# Patient Record
Sex: FEMALE | Race: WHITE | NOT HISPANIC OR LATINO | Employment: UNEMPLOYED | URBAN - METROPOLITAN AREA
[De-identification: names, ages, dates, MRNs, and addresses within clinical notes are randomized per-mention and may not be internally consistent; named-entity substitution may affect disease eponyms.]

---

## 2017-12-28 PROBLEM — E66.9 OBESITY (BMI 30-39.9): Status: ACTIVE | Noted: 2017-12-28

## 2018-04-12 ENCOUNTER — HOSPITAL ENCOUNTER (OUTPATIENT)
Dept: RADIOLOGY | Facility: MEDICAL CENTER | Age: 38
End: 2018-04-12

## 2018-04-23 ENCOUNTER — OFFICE VISIT (OUTPATIENT)
Dept: PULMONOLOGY | Facility: HOSPICE | Age: 38
End: 2018-04-23
Payer: OTHER GOVERNMENT

## 2018-04-23 VITALS
HEART RATE: 86 BPM | DIASTOLIC BLOOD PRESSURE: 78 MMHG | SYSTOLIC BLOOD PRESSURE: 118 MMHG | BODY MASS INDEX: 38.98 KG/M2 | OXYGEN SATURATION: 96 % | HEIGHT: 63 IN | RESPIRATION RATE: 16 BRPM | WEIGHT: 220 LBS | TEMPERATURE: 97.7 F

## 2018-04-23 DIAGNOSIS — G47.33 OBSTRUCTIVE SLEEP APNEA: ICD-10-CM

## 2018-04-23 DIAGNOSIS — E66.9 OBESITY (BMI 30-39.9): ICD-10-CM

## 2018-04-23 PROCEDURE — 99204 OFFICE O/P NEW MOD 45 MIN: CPT | Performed by: INTERNAL MEDICINE

## 2018-04-23 NOTE — PATIENT INSTRUCTIONS
This lady comes in for evaluation of probable sleep apnea with nighttime palpitations, extensive workup recently by cardiology identified no heart disease, normal echo and normal stress test, but palpitations occur at night and awaken her from sleep. She sometimes awakens dizzy. I saw suspect she has significant sleep-disordered breathing with medically significant excessive weight, awakening at night with snoring and resuscitative snorts, daytime sleepiness, and we will proceed as quickly as possible toward a sleep study.    She does have neurology evaluation pending to make certain there is not a central nervous system contribution to her cardiac events.    I strongly suspect that sleep disordered breathing is a major contributor, I showed her examples of cardiac irritability related to hypoxemia, and then the benefit of CPAP and reviewed expectations.    We will arrange for a sleep study, hopefully split-night, weight loss is advised with portion control and gentle exercise given the body mass index of 39, but she has knee problems and has been unable to exercise recently. We will see her back in the sleep center once we have results of her sleep study.

## 2018-04-23 NOTE — PROGRESS NOTES
Karol Rhodes is a 38 y.o. female here for palpitations with possible sleep-disordered breathing. Patient was referred by her primary care doctor.    History of Present Illness:    This lady comes in for evaluation of probable sleep apnea with nighttime palpitations, extensive workup recently by cardiology identified no heart disease, normal echo and normal stress test, but palpitations occur at night and awaken her from sleep. She sometimes awakens dizzy. I saw suspect she has significant sleep-disordered breathing with medically significant excessive weight, awakening at night with snoring and resuscitative snorts, daytime sleepiness, and we will proceed as quickly as possible toward a sleep study.    She does have neurology evaluation pending to make certain there is not a central nervous system contribution to her cardiac events.    I strongly suspect that sleep disordered breathing is a major contributor, I showed her examples of cardiac irritability related to hypoxemia, and then the benefit of CPAP and reviewed expectations.    We will arrange for a sleep study, hopefully split-night, weight loss is advised with portion control and gentle exercise given the body mass index of 39, but she has knee problems and has been unable to exercise recently. We will see her back in the sleep center once we have results of her sleep study.    Of note her pulmonary history includes evaluation for prior shortness of breath, CT scan showed no pulmonary embolus, she does not have cough hemoptysis and no current active symptoms. Deconditioning with excessive weight and knee pain are all noted. She does have 5 children.      Constitutional ROS: No unexpected change in weight, No unexplained fevers  Eyes: No change in vision or blurring or double vision  Mouth/Throat ROS: No sore throat, No recent change in voice or hoarseness  Pulmonary ROS: See present history for pertinent positives  Cardiovascular ROS: No chest pain to  "suggest acute coronary syndrome  Gastrointestinal ROS: No abdominal pain to suggest peptic disease  Musculoskeletal/Extremities ROS: no acute artritis or unusual swelling  Hematologic/Lymphatic ROS: No easy bleeding or unusual lymph node swelling  Neurologic ROS: No new or unusual weakness  Psychiatric ROS: No hallucinations  Allergic/Immunologic: No  urticaria or allergic rash      Current Outpatient Prescriptions   Medication Sig Dispense Refill   • azithromycin (ZITHROMAX) 250 MG Tab Use Z-pack as directed 6 Tab 0     No current facility-administered medications for this visit.        Social History   Substance Use Topics   • Smoking status: Former Smoker     Packs/day: 0.25     Years: 5.00     Types: Cigarettes   • Smokeless tobacco: Never Used      Comment: smoked only socially, off and on over 5yrs; quit quite a few yrs ago   • Alcohol use Yes      Comment: beer and liquor rare use 1-2        Past Medical History:   Diagnosis Date   • Arthritis    • Fatigue    • History of chickenpox    • Menarche     age 9   • Skipped heart beats    • Sleep difficulties     increased daytime sleepiness   • SOB (shortness of breath)    • Weight gain        Past Surgical History:   Procedure Laterality Date   • GYN SURGERY      vaginal cyst removal   • OTHER ORTHOPEDIC SURGERY      left knee x 5, right knee       Allergies: Patient has no known allergies.    Family History   Problem Relation Age of Onset   • Arthritis Mother    • Cancer Other      cancer grandparents   • Other Other      bleeding tendency grandmother; auth with convulsions   • Diabetes Other      nephew and neice       Physical Examination    Vitals:    04/23/18 1345   Height: 1.6 m (5' 3\")   Weight: 99.8 kg (220 lb)   Weight % change since last entry.: 0 %   BP: 118/78   Pulse: 86   BMI (Calculated): 38.97   Resp: 16   Temp: 36.5 °C (97.7 °F)   O2 sat % room air: 96 %       General Appearance: alert, no distress  Skin: Skin color, texture, turgor normal. No " rashes or lesions.  Eyes: negative  Oropharynx: Lips, mucosa, and tongue normal. Teeth and gums normal. Oropharynx moist and without lesion  Lungs: positive findings: Remarkably clear  Heart: negative. RRR without murmur, gallop, or rubs.  No ectopy.  Abdomen: Abdomen soft, non-tender. . No masses,  No organomegaly  Extremities:  No deformities, edema, or skin discoloration  Joints: No acute arthritis  Peripheral Pulses:perfused  Neurologic: intact grossly      I (soft palate, uvula, fauces, tonsillar pillars visible)    Imaging: Prior chest CT scan did not show active process or emboli    PFTS: None needed      Assessment and Plan  1. Obstructive sleep apnea  - POLYSOMNOGRAPHY, 4 OR MORE; Future    2. Obesity (BMI 30-39.9)  - POLYSOMNOGRAPHY, 4 OR MORE; Future    This lady comes in for evaluation of probable sleep apnea with nighttime palpitations, extensive workup recently by cardiology identified no heart disease, normal echo and normal stress test, but palpitations occur at night and awaken her from sleep. She sometimes awakens dizzy. I saw suspect she has significant sleep-disordered breathing with medically significant excessive weight, awakening at night with snoring and resuscitative snorts, daytime sleepiness, and we will proceed as quickly as possible toward a sleep study.    She does have neurology evaluation pending to make certain there is not a central nervous system contribution to her cardiac events.    I strongly suspect that sleep disordered breathing is a major contributor, I showed her examples of cardiac irritability related to hypoxemia, and then the benefit of CPAP and reviewed expectations.    We will arrange for a sleep study, hopefully split-night, weight loss is advised with portion control and gentle exercise given the body mass index of 39, but she has knee problems and has been unable to exercise recently. We will see her back in the sleep center once we have results of her sleep  study.  Followup Return for follow up visit with sleep provider, after sleep study, With MD or MERCEDES.

## 2018-05-21 ENCOUNTER — SLEEP STUDY (OUTPATIENT)
Dept: SLEEP MEDICINE | Facility: MEDICAL CENTER | Age: 38
End: 2018-05-21
Attending: INTERNAL MEDICINE
Payer: OTHER GOVERNMENT

## 2018-05-21 DIAGNOSIS — G47.33 OBSTRUCTIVE SLEEP APNEA: ICD-10-CM

## 2018-05-21 DIAGNOSIS — E66.9 OBESITY (BMI 30-39.9): ICD-10-CM

## 2018-05-21 PROCEDURE — 95811 POLYSOM 6/>YRS CPAP 4/> PARM: CPT | Performed by: INTERNAL MEDICINE

## 2018-05-22 NOTE — PROCEDURES
CLINICAL COMMENTS:  The patient underwent a split night polysomnogram with a CPAP titration using the standard montage for measurement of parameters of sleep, respiratory events, movement abnormalities, heart rate and rhythm. A microphone was used to monitor snoring.    INTERPRETATION: The study was started at 10:50:09 PM.  The diagnostic recording time was 155.2 minutes with a sleep period of 130.2 minutes.  Total sleep time was 118.2 minutes with a sleep efficiency of 76.2%.  The sleep latency was 25.0 minutes, and REM latency was 76.0 minutes.  The patient had 20 arousals in total, for an arousal index of 10.1.        RESPIRATORY: The patient had 3 apneas in total.  Of these, 3 were obstructive apneas, and 0 were central apneas.  This resulted in an apnea index (AI) of 1.5.  The patient had 32 hypopneas in total, which resulted in a hypopnea index of 16.2.  The overall AHI was 17.8, while the AHI during REM was 68.0.  The supine AHI = 45.2.    OXIMETRY: Oxygen saturation monitoring showed a mean SpO2 of 93.5% for the diagnostic part of the study, with a minimum oxygen saturation of 86.0%.  Oxygen saturations were below 89% for 2.2% of sleep time.    CARDIAC: The highest heart rate for the first part of the study was 98.0 beats per minute.  The average heart rate during sleep was 76.0 bpm, while the highest heart rate was 96.0 bpm.    LIMB MOVEMENTS: There were a total of 10 periodic limb movements during sleep, of which 5 were PLMS arousals.  This resulted in a PLMS index of 5.1 and a PLMS arousal index of 2.5.    TREATMENT    Treatment recording time was 262.1 minutes with a total sleep time of 204.0min.  The patient had an arousal index of 11.5.      RESPIRATORY: The patient had 0 obstructive apneas, 1 central apneas, and 12 hypopneas for an overall AHI was 3.8.    OXIMETRY: The mean SpO2 during treatment was 95.2%, with a minimum oxygen saturation of 90.0%.      CPAP was tried from 4 to 7cmH2O.    Summary:      This was an overnight diagnostic polysomnogram with a subsequent positive airway pressure titration.  The patient chose to use a small Dreamwear mask with heated humidification.      During the diagnostic phase the total recording time was 155.2 minutes, the sleep period time was 130.2, and the total sleep time was 118.2 minutes.  The patient's sleep efficiency was 76.2 percent which is slightly reduced.  The patient experienced 1 REM periods.    The sleep stage durations revealed 12 minutes of wake after sleep onset (WASO), 10 minutes of N1 sleep, 49.2 minutes of N2 sleep, 44 minutes of N3 sleep, and 15 minutes of REM.    The latency to sleep was 25 minutes.  The latency to REM was 76 minutes.  Mild sleep fragmentation occurred.  The arousal index was 10.1.  The Total Limb Movement Index was 5.1, the Limb Movement with Arousal Index was 2.5, and the PLM Series Index was 0.0.    The patient experienced 35 apneas and hypopneas and 0 RERAS.  The apnea hypopnea index was 17.8, the RDI was 17.8, the mean event duration was 14.5 seconds, and the longest event lasted 26.5 seconds.  The REM index was 68.0 and the supine index was 45.2.  The apnea hypopnea index represents a moderate degree of obstructive sleep apnea hypopnea.    The edwar saturation during sleep was 86 % and the patient spent 6.2% of the time with saturations less than or equal to 90%.    During sleep the minimum heart rate was 48 bpm, the mean heart rate was 67 bpm, and the maximum heart rate was 96 bpm.    Once the patient met the split-night protocol, the technician performed a positive airway pressure titration.  The technician initiated treatment with CPAP set at 4 cmH2O and increased the pressure to a maximum of 7 cmH2O.    The patient did best on CPAP at 7 cm H2O with a resultant AHI of 3.9, a minimum saturation of 90.0 %, and a mean saturation of 94.9 %.    Recommendation:    Recommend CPAP at 7 cmH2O using a mask of choice and heated  humidification followed by data card and clinical review in 6-8 weeks.

## 2018-06-06 ENCOUNTER — SLEEP CENTER VISIT (OUTPATIENT)
Dept: SLEEP MEDICINE | Facility: MEDICAL CENTER | Age: 38
End: 2018-06-06
Payer: OTHER GOVERNMENT

## 2018-06-06 VITALS
BODY MASS INDEX: 39.51 KG/M2 | HEIGHT: 63 IN | HEART RATE: 91 BPM | OXYGEN SATURATION: 98 % | DIASTOLIC BLOOD PRESSURE: 72 MMHG | WEIGHT: 223 LBS | RESPIRATION RATE: 15 BRPM | SYSTOLIC BLOOD PRESSURE: 122 MMHG

## 2018-06-06 DIAGNOSIS — E66.9 OBESITY (BMI 35.0-39.9 WITHOUT COMORBIDITY): ICD-10-CM

## 2018-06-06 DIAGNOSIS — G47.33 OSA (OBSTRUCTIVE SLEEP APNEA): ICD-10-CM

## 2018-06-06 PROCEDURE — 99213 OFFICE O/P EST LOW 20 MIN: CPT | Performed by: FAMILY MEDICINE

## 2018-06-06 NOTE — PROGRESS NOTES
[facility] Follow Up Note     Date: 6/6/2018 / Time: 9:40 AM    Patient ID:   Name:             Karol Rhodes     YOB: 1980  Age:                 38 y.o.  female   MRN:               [id number]      Thank you for requesting a sleep medicine consultation on Karol Rhodes at the sleep center. She presents today with the chief complaints of JODIE and SS follow up.     HISTORY OF PRESENT ILLNESS:       Pt is currently not on PAP therapy. She goes to sleep around 10 pm and wakes up around 6-7 am. She is getting about 6-7 hrs of sleep on a good night and about 3-4 hr of sleep on a bad night. The bad nights are about 4-5 per week.The symptoms of excessive daytime, snoring and gasping has continues.     Since her last visit she had split night study which showed The overall AHI was 17.8, while the AHI during REM was 68.0. The edwar saturation during sleep was 86 % and the patient spent 6.2% of the time with saturations less than or equal to 90%. initiated treatment with CPAP set at 4 cmH2O and increased the pressure to a maximum of 7 cmH2O.The patient did best on CPAP at 7 cm H2O with a resultant AHI of 3.9, a minimum saturation of 90.0 %, and a mean saturation of 94.9 %.      REVIEW OF SYSTEMS:       Constitutional: Denies fevers, Denies weight changes  Eyes: Denies changes in vision, no eye  discharge  Ears/Nose/Throat/Mouth: Denies nasal congestion or sore throat   Cardiovascular: Denies chest pain or palpitations   Respiratory: Denies shortness of breath , Denies cough  Gastrointestinal/Hepatic: Denies abdominal pain, nausea, vomiting, diarrhea, constipation or GI bleeding   Genitourinary: Denies bladder dysfunction, dysuria or frequency  Skin/Breast: Denies rash,   Neurological: Denies headache, confusion, memory loss + numbness  Psychiatric: denies mood disorder     Comprehensive review of systems form is reviewed with the patient and is attached in the EMR.     PMH:  has a past  medical history of Arthritis; Fatigue; History of chickenpox; Menarche; Skipped heart beats; Sleep difficulties; SOB (shortness of breath); and Weight gain. She also has no past medical history of Addisons disease ([facility]); Adrenal disorder ([facility]); Allergy; Anemia; Anxiety; Arrhythmia; Asthma; Blood transfusion without reported diagnosis; Cancer (HCC); Cataract; Change in weight; CHF (congestive heart failure) ([facility]); Clotting disorder (HCC); COPD (chronic obstructive pulmonary disease) (HCC); Cushings syndrome (HCC); Depression; Diabetes (HCC); Diabetic neuropathy (HCC); GERD (gastroesophageal reflux disease); Glaucoma; Goiter; Headache(784.0); Heart attack (HCC); Heart murmur; HIV (human immunodeficiency virus infection) ([facility]); Hyperlipidemia; Hypertension; IBD (inflammatory bowel disease); Kidney disease; Meningitis; Migraine; Muscle disorder; Osteoporosis; Parathyroid disorder (HCC); Pituitary disease (HCC); Pulmonary emphysema (HCC); Seizure ([facility]); Sickle cell disease ([facility]); Stroke ([facility]); Substance abuse; Tuberculosis; Ulcer; or Urinary tract infection, site not specified.  MEDS:   Current Outpatient Prescriptions:   •  azithromycin (ZITHROMAX) 250 MG Tab, Use Z-pack as directed, Disp: 6 Tab, Rfl: 0  ALLERGIES: No Known Allergies  SURGHX:   Past Surgical History:   Procedure Laterality Date   • GYN SURGERY      vaginal cyst removal   • OTHER ORTHOPEDIC SURGERY      left knee x 5, right knee     SOCHX:  reports that she has quit smoking. Her smoking use included Cigarettes. She has a 1.25 pack-year smoking history. She has never used smokeless tobacco. She reports that she drinks alcohol. She reports that she does not use drugs..  FH:   Family History   Problem Relation Age of Onset   • Arthritis Mother    • Cancer Other      cancer grandparents   • Other Other      bleeding tendency grandmother; auth with convulsions   • Diabetes Other      nephew and neice         Physical Exam:  Vitals/ General Appearance:    Weight/BMI: There is no height or weight on file to calculate BMI.  There were no vitals taken for this visit.  There were no vitals filed for this visit.    Pt. is alert and oriented to time, place and person. Cooperative and in no apparent distress.       1. Head: Atraumatic, normocephalic.   2. Ears: Normal tympanic membrane and no discharge  3. Nose: No inferior turbinate hypertophy, no septal deviation, no polyp.   4. Throat: Oropharynx appears crowded in that the palate is overhanging 5. Neck: Supple. No thyromegaly  6. Chest: Trachea central, no spine deformity   7. Lungs auscultation: B/L good air entry, vesicular breath sounds, no adventitious sounds  8. Heart auscultation: 1st and 2nd heart sounds normal, regular rhythm. No appreciable murmur.  9.  Extremities: no clubbing, no pedal edema.  10. Skin: No rash      INVESTIGATIONS:           ASSESSMENT AND PLAN     1.Moderate Sleep Apnea (JODIE).      The pathophysiology of sleep anea and the increased risk of cardiovascular morbidity from untreated sleep apnea is discussed in detail with the patient.    She is urged to avoid supine sleep, weight gain and alcoholic beverages since all of these can worsen sleep apnea. She is cautioned against drowsy driving. If She feels sleepy while driving, She must pull over for a break/nap, rather than persist on the road, in the interest of She own safety and that of others on the road.   Plan   - Auto CPAP vs overnight CPAP titration. After informed discussion CPAP 7 cm with dreamwear nasal pillow is ordered today    - F/u in 6 weeks to assess the efficiacy of recommended pressure    - SS was reviewed and discussed with the pt   - compliance was reinforced     2. Body mass index is 39.5 kg/m².  Obesity  - recommended healthy diet with portion control , aerobic exercise 5x week  - obesity counseling done today: Drink more water. Reduce carb intake in drinks. Try to eat 3 meals a day with last meal 4-6 hours prior to  bedtime. Limit caloric intake to 1600 - 1800 kcal per day.Restrict carbohydrate intake to 50 g per day to 100 g per day        3. Regarding treatment of other past medical problems and general health maintenance,  She is urged to follow up with PCP.

## 2018-07-30 ENCOUNTER — OFFICE VISIT (OUTPATIENT)
Dept: NEUROLOGY | Facility: MEDICAL CENTER | Age: 38
End: 2018-07-30
Payer: OTHER GOVERNMENT

## 2018-07-30 VITALS
TEMPERATURE: 96.6 F | WEIGHT: 227 LBS | BODY MASS INDEX: 40.22 KG/M2 | HEIGHT: 63 IN | SYSTOLIC BLOOD PRESSURE: 120 MMHG | OXYGEN SATURATION: 96 % | HEART RATE: 86 BPM | RESPIRATION RATE: 14 BRPM | DIASTOLIC BLOOD PRESSURE: 72 MMHG

## 2018-07-30 DIAGNOSIS — M54.2 NECK PAIN: ICD-10-CM

## 2018-07-30 DIAGNOSIS — M54.16 LUMBAR RADICULOPATHY: ICD-10-CM

## 2018-07-30 DIAGNOSIS — R55 SYNCOPE AND COLLAPSE: ICD-10-CM

## 2018-07-30 DIAGNOSIS — G56.03 BILATERAL CARPAL TUNNEL SYNDROME: ICD-10-CM

## 2018-07-30 DIAGNOSIS — R00.2 PALPITATIONS: ICD-10-CM

## 2018-07-30 DIAGNOSIS — E66.9 CLASS 2 OBESITY IN ADULT, UNSPECIFIED BMI, UNSPECIFIED OBESITY TYPE, UNSPECIFIED WHETHER SERIOUS COMORBIDITY PRESENT: ICD-10-CM

## 2018-07-30 DIAGNOSIS — R42 DIZZINESS AND GIDDINESS: ICD-10-CM

## 2018-07-30 DIAGNOSIS — R20.2 PARESTHESIAS: ICD-10-CM

## 2018-07-30 DIAGNOSIS — F40.240 CLAUSTROPHOBIA: ICD-10-CM

## 2018-07-30 DIAGNOSIS — M54.12 CERVICAL RADICULITIS: ICD-10-CM

## 2018-07-30 PROCEDURE — 99205 OFFICE O/P NEW HI 60 MIN: CPT | Performed by: PSYCHIATRY & NEUROLOGY

## 2018-07-30 RX ORDER — ALPRAZOLAM 1 MG/1
1 TABLET ORAL ONCE
Qty: 1 TAB | Refills: 0 | Status: SHIPPED | OUTPATIENT
Start: 2018-07-30 | End: 2018-07-30

## 2018-07-30 NOTE — PROGRESS NOTES
Chief Complaint   Patient presents with   • New Patient     syncope, tingling in extremeties       Problem List Items Addressed This Visit     None      Visit Diagnoses     Syncope and collapse        Relevant Medications    ALPRAZolam (XANAX) 1 MG Tab    Other Relevant Orders    REFERRAL TO NEURODIAGNOSTICS (EEG,EP,EMG/NCS/DBS) Modality Requested: EEG-Video    MR-BRAIN-W/O    Paresthesias        Relevant Orders    MR-BRAIN-W/O    MR-CERVICAL SPINE-W/O    REFERRAL TO NEURODIAGNOSTICS (EEG,EP,EMG/NCS/DBS) Modality Requested: EMG/NCS-Comment Extremities (Bilat UE's)    Palpitations        Dizziness and giddiness        Relevant Medications    ALPRAZolam (XANAX) 1 MG Tab    Other Relevant Orders    REFERRAL TO NEURODIAGNOSTICS (EEG,EP,EMG/NCS/DBS) Modality Requested: EEG-Video    MR-BRAIN-W/O    Class 2 obesity in adult, unspecified BMI, unspecified obesity type, unspecified whether serious comorbidity present        Neck pain        Relevant Orders    MR-CERVICAL SPINE-W/O    Cervical radiculitis        Relevant Medications    ALPRAZolam (XANAX) 1 MG Tab    Other Relevant Orders    MR-CERVICAL SPINE-W/O    REFERRAL TO NEURODIAGNOSTICS (EEG,EP,EMG/NCS/DBS) Modality Requested: EMG/NCS-Comment Extremities (Bilat UE's)    Lumbar radiculopathy        Relevant Medications    ALPRAZolam (XANAX) 1 MG Tab    Bilateral carpal tunnel syndrome        Relevant Medications    ALPRAZolam (XANAX) 1 MG Tab    Other Relevant Orders    REFERRAL TO NEURODIAGNOSTICS (EEG,EP,EMG/NCS/DBS) Modality Requested: EMG/NCS-Comment Extremities (Bilat UE's)    Claustrophobia        Relevant Medications    ALPRAZolam (XANAX) 1 MG Tab          History of present illness:  Karol Rhodes 38 y.o. female presents today for evaluation of multiple complaints.     Syncope: 11/2017, felt dizzy, had palpitations, passed out for several seconds, no convulsion or jerky movements, not confused or incontinence. Has never had a seizure spell. Currently driving,  denies any further issues.     Palpitations, dizzy spells: for about a year, seen by cardiology, testing ok per pt, had holter. Cardiologist sending her to us due to lack of etiology for symptoms.     Recurrent numbness of both hands: particularly at night, has to shake them, for the last year or so, getting worse.     Neck pain and bilateral upper extremities pain and weakness: for about a year as well, progressively worse, on/off.    Head popping sensation, no pain: this was once, several months ago. No other issues. Denies headaches.     H/o JODIE. Had episode where she woke up and could not move. Was aware and conscious.     There is no h/o tumors, TBI, stroke or MS.     Chronic back pain with radiation to left leg. For years, goes to chiropractor and it helps, denies weakness, not interested in work up or further management now.       Past medical history:   Past Medical History:   Diagnosis Date   • Arthritis    • Fatigue    • History of chickenpox    • Menarche     age 9   • Skipped heart beats    • Sleep difficulties     increased daytime sleepiness   • SOB (shortness of breath)    • Weight gain        Past surgical history:   Past Surgical History:   Procedure Laterality Date   • GYN SURGERY      vaginal cyst removal   • OTHER ORTHOPEDIC SURGERY      left knee x 5, right knee       Family history:   Family History   Problem Relation Age of Onset   • Arthritis Mother    • Cancer Other         cancer grandparents   • Other Other         bleeding tendency grandmother; auth with convulsions   • Diabetes Other         nephew and neice       Social history:   Social History     Social History   • Marital status:      Spouse name: N/A   • Number of children: N/A   • Years of education: N/A     Occupational History   • Not on file.     Social History Main Topics   • Smoking status: Former Smoker     Packs/day: 0.25     Years: 5.00     Types: Cigarettes   • Smokeless tobacco: Never Used      Comment: smoked  only socially, off and on over 5yrs; quit quite a few yrs ago   • Alcohol use Yes      Comment: beer and liquor rare use 1-2   • Drug use: No   • Sexual activity: Yes     Partners: Male      Comment: ; moved back here 10/'14 (from [place])  grew up here; 2 step children but they don't live w/ pt and her      Other Topics Concern   • Not on file     Social History Narrative   • No narrative on file       Current medications:   Current Outpatient Prescriptions   Medication   • ALPRAZolam (XANAX) 1 MG Tab     No current facility-administered medications for this visit.        Medication Allergy:  No Known Allergies      Review of systems:   Constitutional: denies fever, night sweats, weight loss, or malaise/fatigue.   Eyes: denies acute vision change, eye pain or secretion.   Ears, Nose, Mouth, Throat: denies nasal secretion, nasal bleeding, difficulty swallowing, hearing loss, tinnitus, vertigo, ear pain, oral ulcers or lesions.   Endocrine: denies recent weight changes, heat or cold intolerance, polyuria, polydypsia, polyphagia,abnormal hair growth.  Cardiovascular: denies new onset of chest pain, palpitations, lower extremity edema, or dyspnea of exertion.  Pulmonary: denies shortness of breath, new onset of cough, hemoptysis, wheezing, chest pain or flu-like symptoms.   GI: denies nausea, vomiting, diarrhea, GI bleeding, change in appetite, abdominal pain, and change in bowel habits.  : denies urinary incontinence, retention or hematuria.  Heme/oncology: denies history of easy bruising or bleeding. No history of cancer.   Allergy/immunology: denies hives/urticarial.  Dermatologic: denies new rash.  Musculoskeletal:denies joint swelling or pain, muscle pain, neck and back pain.   Neurologic: denies headaches, acute visual changes, facial droopiness, anesthesia, ataxia, change in speech or language, memory loss, abnormal movements, seizures, loss of consciousness, or episodes of confusion.   Psychiatric:  "denies symptoms of depression, anxiety, hallucinations, mood swings or changes, suicidal or homicidal thoughts.     Physical examination:   Vitals:    07/30/18 1058   BP: 120/72   Pulse: 86   Resp: 14   Temp: 35.9 °C (96.6 °F)   SpO2: 96%   Weight: 103 kg (227 lb)   Height: 1.6 m (5' 3\")     General: Patient in no acute distress, pleasant and cooperative.  HEENT: Normocephalic, no signs of acute trauma.   Neck: supple, no meningeal signs or carotid bruits. There is normal range of motion. No tenderness on exam.   Chest: clear to auscultation. No cough.   CV: RRR, no murmurs.   Abdomen: soft, non distended or tender.   Skin: no signs of acute rashes or trauma.   Musculoskeletal: joints exhibit full range of motion, without any pain to palpation. There are no signs of joint or muscle swelling. There is no tenderness to deep palpation of muscles.   Psychiatric: No hallucinatory behavior. Denies symptoms of depression or suicidal ideation. Mood and affect appear normal on exam.     NEUROLOGICAL EXAM:   Mental status, orientation: Awake, alert and fully oriented.   Speech and language: speech is clear and fluent. The patient is able to name, repeat and comprehend.   Memory: There is intact recollection of recent and remote events.   Cranial nerve exam: Pupils are 3-4 mm bilaterally and equally reactive to light and accommodation. Visual fields are intact by confrontation. Fundoscopic exam was unremarkable. There is no nystagmus on primary or secondary gaze. Intact full EOM in all directions of gaze. Face appears symmetric. Sensation in the face is intact to light touch. Uvula is midline. Palate elevates symmetrically. Tongue is midline and without any signs of tongue biting or fasciculations.Sternocleidomastoid muscles exhibit is normal strength bilaterally. Shoulder shrug is intact bilaterally.   Motor exam: Strength is 5/5 in all extremities. Tone is normal. No abnormal movements were seen on exam.   Sensory exam " reveals normal sense of light touch in all extremities.   Deep tendon reflexes:  2+ throughout. Plantar responses are flexor. There is no clonus.   Coordination: shows a normal finger-nose-finger. Normal rapidly alternating movements.   Gait: The patient was able to get up from seated position on first attempt without requiring assistance. Found to be steady when walking. Movements were fluid with normal arm swing. The patient was able to turn without difficulties or tendency to fall. Romberg exam unremarkable.         ANCILLARY DATA REVIEWED:       Lab Data Review:  Reviewed in chart.     Records reviewed:   Chart reviewed.       Imaging: None.       EEG: None.         ASSESSMENT AND PLAN:    1. Syncope and collapse  - REFERRAL TO NEURODIAGNOSTICS (EEG,EP,EMG/NCS/DBS) Modality Requested: EEG-Video  - MR-BRAIN-W/O; Future  - ALPRAZolam (XANAX) 1 MG Tab; Take 1 Tab by mouth Once for 1 dose. 1 tab po about 45 mins prior to MRIs.  Dispense: 1 Tab; Refill: 0    2. Paresthesias  - MR-BRAIN-W/O; Future  - MR-CERVICAL SPINE-W/O; Future  - REFERRAL TO NEURODIAGNOSTICS (EEG,EP,EMG/NCS/DBS) Modality Requested: EMG/NCS-Comment Extremities (Bilat UE's)    3. Palpitations    4. Dizziness and giddiness  - REFERRAL TO NEURODIAGNOSTICS (EEG,EP,EMG/NCS/DBS) Modality Requested: EEG-Video  - MR-BRAIN-W/O; Future    5. Class 2 obesity in adult, unspecified BMI, unspecified obesity type, unspecified whether serious comorbidity present    6. Neck pain  - MR-CERVICAL SPINE-W/O; Future    7. Cervical radiculitis  - MR-CERVICAL SPINE-W/O; Future  - REFERRAL TO NEURODIAGNOSTICS (EEG,EP,EMG/NCS/DBS) Modality Requested: EMG/NCS-Comment Extremities (Bilat UE's)    8. Lumbar radiculopathy    9. Bilateral carpal tunnel syndrome  - REFERRAL TO NEURODIAGNOSTICS (EEG,EP,EMG/NCS/DBS) Modality Requested: EMG/NCS-Comment Extremities (Bilat UE's)    10. Claustrophobia  - ALPRAZolam (XANAX) 1 MG Tab; Take 1 Tab by mouth Once for 1 dose. 1 tab po about 45  mins prior to MRIs.  Dispense: 1 Tab; Refill: 0      CLINICAL DISCUSSION:   Many problems. Single syncopal episode, appears vasovagal. H/o palpitations, negative cardiology work up.  Chronic neck pain, possible bilateral cervical radiculopathy.  R/o bilateral CTS.   Chronic back pain with possible left sciatica, refuses work up, states is better, sees chiropractor for flare ups.  Discussed work up: VEEG, EMG bilateral UE's for possible CTS and bilat Cervical radiculopathies, MRI's brain and c spine. Refuses contrast even though she understands its importance to r/o MS, which is in the differential.   Requested Xanax for severe claustrophobia for MRI, discussed possible side effects, do not drive that day.   Discussed [place] state law regarding driving after dizzy spells, passing out, or seizures. Aware she should not drive for three months if these spells return.   Discussed diet and exercise for weight loss.   Episode of sleep paralysis may be related to JODIE.         FOLLOW-UP:   Return in about 3 months (around 10/30/2018).        EDUCATION AND COUNSELING:  -Education was provided to the patient and/or family regarding diagnosis and prognosis. The chronic and unpredictable nature of the condition were discussed. There is increased risk for additional events, which may carry potential for significant injuries and death.   -We extensively discussed the aspects related to safety in drivers who suffer from epilepsy or syncope. The patient is encourage to report to the Division of Motor Vehicles of any condition and/or spells related to confusion, disorientation, and/or loss of awareness and/or loss of consciousness; as these may pose a safety issue if they occur while operating a motor vehicle. The patient and/or family are ultimately responsible for exercising caution and abiding to regulations in place.   -Other precautions were discussed.  -The patient understands and agrees that due to the complexity of his/her  diagnosis, results of any testing and further recommendations will typically be discussed/made during a face to face encounter in my office. The patient and/or family further understands it is their responsibility to keep proper follow up.     Patient agrees with plan, as outlined.         Chilo Kitchen MD   Epilepsy and Neurodiagnostics.   Clinical  of Neurology [facility].   Diplomate in Neurology, Epilepsy, and Electrodiagnostic Medicine.   Office: [phone]  Fax: [phone]

## 2018-08-08 ENCOUNTER — SLEEP CENTER VISIT (OUTPATIENT)
Dept: SLEEP MEDICINE | Facility: MEDICAL CENTER | Age: 38
End: 2018-08-08
Payer: OTHER GOVERNMENT

## 2018-08-08 VITALS
OXYGEN SATURATION: 97 % | HEART RATE: 77 BPM | DIASTOLIC BLOOD PRESSURE: 60 MMHG | WEIGHT: 226 LBS | HEIGHT: 63 IN | BODY MASS INDEX: 40.04 KG/M2 | RESPIRATION RATE: 16 BRPM | SYSTOLIC BLOOD PRESSURE: 100 MMHG

## 2018-08-08 DIAGNOSIS — G47.33 OSA (OBSTRUCTIVE SLEEP APNEA): ICD-10-CM

## 2018-08-08 DIAGNOSIS — E66.9 OBESITY (BMI 35.0-39.9 WITHOUT COMORBIDITY): ICD-10-CM

## 2018-08-08 DIAGNOSIS — M17.12 PRIMARY OSTEOARTHRITIS OF LEFT KNEE: ICD-10-CM

## 2018-08-08 DIAGNOSIS — Z87.891 HISTORY OF TOBACCO ABUSE: ICD-10-CM

## 2018-08-08 PROCEDURE — 99214 OFFICE O/P EST MOD 30 MIN: CPT | Performed by: INTERNAL MEDICINE

## 2018-08-08 NOTE — PROGRESS NOTES
CC: Follow up for sleep disturbance    HPI:  Karol Rhodes is a 37 y/o F who was last seen 6/6/18.  Her PSG performed 5/21/18 showed an AHI of 17.8, a REM ahi of 68.0 and a supine index of 45.2 with a edwar sat of 86%. Is currently on cpap set at 7 cmH2O.    The 30 day compliance reveals usage for 29 out of 30 days, an average of 7 hours 16 minutes, with a residual average AHI of 1.9. The median time in large leak has been 8.9 L/min.    Evidently Jessica rea was unable to furnish her with the Dreamwear mask.  We will do a mask today with our sleep technician.    She has been doing well with increased energy.  She continues to derive significant benefit from the use of her CPAP set at 7 cm H2O.          Patient Active Problem List    Diagnosis Date Noted   • Obesity (BMI 35.0-39.9 without comorbidity) (HCC) 06/06/2018   • JODIE (obstructive sleep apnea) 04/23/2018   • Obesity (BMI 30-39.9) 12/28/2017   • Primary osteoarthritis of left knee 10/19/2016   • Fatigue 04/23/2015       Past Medical History:   Diagnosis Date   • Arthritis    • Fatigue    • History of chickenpox    • Menarche     age 9   • Skipped heart beats    • Sleep difficulties     increased daytime sleepiness   • SOB (shortness of breath)    • Weight gain         Past Surgical History:   Procedure Laterality Date   • GYN SURGERY      vaginal cyst removal   • OTHER ORTHOPEDIC SURGERY      left knee x 5, right knee       Family History   Problem Relation Age of Onset   • Arthritis Mother    • Cancer Other         cancer grandparents   • Other Other         bleeding tendency grandmother; auth with convulsions   • Diabetes Other         nephew and neice       Social History     Social History   • Marital status:      Spouse name: N/A   • Number of children: N/A   • Years of education: N/A     Occupational History   • Not on file.     Social History Main Topics   • Smoking status: Former Smoker     Packs/day: 0.25     Years: 5.00     Types:  "Cigarettes   • Smokeless tobacco: Never Used      Comment: smoked only socially, off and on over 5yrs; quit quite a few yrs ago   • Alcohol use Yes      Comment: beer and liquor rare use 1-2   • Drug use: No   • Sexual activity: Yes     Partners: Male      Comment: ; moved back here 10/'14 (from [place])  grew up here; 2 step children but they don't live w/ pt and her      Other Topics Concern   • Not on file     Social History Narrative   • No narrative on file       No current outpatient prescriptions on file.     No current facility-administered medications for this visit.     \"CURRENT RX\"    ALLERGIES: Patient has no known allergies.    ROS    Constitutional: Denies fever, chills, sweats,  weight loss, fatigue  Cardiovascular: Denies chest pain, tightness, palpitations, swelling in legs/feet, fainting, difficulty breathing when lying down but gets better when sitting up.   Respiratory: Denies shortness of breath, cough, sputum, wheezing, painful breathing, coughing up blood.   Sleep: per HPI  Gastrointestinal: Denies  difficulty swallowing, nausea, abdominal pain, diarrhea, constipation, heartburn..   Musculoskeletal: Denies painful joints, sore muscles, back pain.    Neurologic: Positive for memory loss      PHYSICAL EXAM  MSEW    /60   Pulse 77   Resp 16   Ht 1.6 m (5' 3\")   Wt 102.5 kg (226 lb)   SpO2 97%   BMI 40.03 kg/m²   Appearance: Well-nourished, well-developed, no acute distress  Eyes:  PERRLA, EOMI  ENMT: without lesions, deformities;hearing grossly intact; tongue normal, posterior pharynx without erythema or exudate; Mallampati classification:   Neck: Supple, trachea midline, no masses  Respiratory effort:  No intercostal retractions or use of accessory muscles  Lung auscultation:  No wheezes rhonchi rubs or rales  Cardiac: No murmurs, rubs, or gallops; regular rhythm, normal rate; no edema  Abdomen:  No tenderness, no organomegaly  Musculoskeletal:  Grossly normal; gait and " station normal; digits and nails normal  Skin:  No rashes, petechiae, cyanosis  Neurologic: without focal signs; oriented to person, time, place, and purpose; judgement intact  Psychiatric:  No depression, anxiety, agitation        PROBLEMS:  1. JODIE (obstructive sleep apnea)    - DME MASK AND SUPPLIES: Dreamware FFM medium frame and small cushions    2. Obesity (BMI 35.0-39.9 without comorbidity) ([facility])    - OBESITY COUNSELING (No Charge): Patient identified as having weight management issue.  Appropriate orders and counseling given.    3. Primary osteoarthritis of left knee      4. History of tobacco abuse            PLAN:     Return in about 3 months (around 11/8/2018).    Has been advised to continue the current CPAP 7 cm, clean equipment frequently, and get new mask and supplies as allowed by insurance and DME. Advised about potential problems including nasal obstruction/stuffiness, mask leak or discomfort , frequent awakenings, chill or dryness of the upper airway, noise, inconvenience, and lack of benefit. Recommend an earlier appointment, if significant treatment barriers develop.    Will do a mask fit today.

## 2018-11-29 ENCOUNTER — SLEEP CENTER VISIT (OUTPATIENT)
Dept: SLEEP MEDICINE | Facility: MEDICAL CENTER | Age: 38
End: 2018-11-29
Payer: OTHER GOVERNMENT

## 2018-11-29 VITALS
SYSTOLIC BLOOD PRESSURE: 110 MMHG | HEIGHT: 62 IN | OXYGEN SATURATION: 97 % | WEIGHT: 216 LBS | HEART RATE: 71 BPM | DIASTOLIC BLOOD PRESSURE: 72 MMHG | RESPIRATION RATE: 15 BRPM | BODY MASS INDEX: 39.75 KG/M2

## 2018-11-29 DIAGNOSIS — G47.33 OSA (OBSTRUCTIVE SLEEP APNEA): ICD-10-CM

## 2018-11-29 PROCEDURE — 99213 OFFICE O/P EST LOW 20 MIN: CPT | Performed by: FAMILY MEDICINE

## 2018-11-29 NOTE — PROGRESS NOTES
[facility] Follow Up Note     Date: 11/29/2018 / Time: 3:09 PM    Patient ID:   Name:             Karol Rhodes     YOB: 1980  Age:                 38 y.o.  female   MRN:               [id number]      Thank you for requesting a sleep medicine consultation on Karol Rhodes at the sleep center. She presents today with the chief complaints of JODIE follow up.     HISTORY OF PRESENT ILLNESS:       Pt is currently on CPAP 7 cm. She goes to sleep around 9-10 pm and wakes up around 7-9 am. She is getting about 7-8 hrs of sleep on a good night. The bad nights are rare. She is using CPAP most days of the week. Pt reports 7 hrs of average nightly use of CPAP. Pt denies snoring, gasping,choking.Pt also denies significant mask leak that is interfering with sleep.      The 30 day compliance was downloaded which shows adequate compliance with more that 4 hr usage about 100%. The AHI is has improved to 0.9/hr. The mask leak is normal. The symptoms of excessive daytime, snoring and gasping has improved.         SLEEP HISTORY   Split night: The apnea hypopnea index was 17.8, the RDI was 17.. The edwar saturation during sleep was 86 % and the patient spent 6.2% of the time with saturations less than or equal to 90%.initiated treatment with CPAP set at 4 cmH2O and increased the   pressure to a maximum of 7 cmH2O.The patient did best on CPAP at 7 cm H2O with a resultant AHI of 3.9, a minimum saturation of 90.0 %, and a mean saturation of 94.9 %.      REVIEW OF SYSTEMS:       Constitutional: Denies fevers, Denies weight changes  Eyes: Denies changes in vision, no eye pain  Ears/Nose/Throat/Mouth: Denies nasal congestion or sore throat   Cardiovascular: Denies chest pain or palpitations   Respiratory: Denies shortness of breath , Denies cough  Gastrointestinal/Hepatic: Denies abdominal pain, nausea, vomiting, diarrhea, constipation or GI bleeding   Genitourinary: Denies bladder dysfunction, dysuria or  frequency  Musculoskeletal/Rheum: Denies  joint pain and swelling   Skin/Breast: Denies rash,   Neurological: Denies headache, confusion, memory loss or focal weakness/parasthesias  Psychiatric: denies mood disorder     Comprehensive review of systems form is reviewed with the patient and is attached in the EMR.     PMH:  has a past medical history of Arthritis; Fatigue; History of chickenpox; Menarche; Skipped heart beats; Sleep difficulties; SOB (shortness of breath); and Weight gain. She also has no past medical history of Addisons disease (HCC); Adrenal disorder (HCC); Allergy; Anemia; Anxiety; Arrhythmia; Asthma; Blood transfusion without reported diagnosis; Cancer (HCC); Cataract; Change in weight; CHF (congestive heart failure) (HCC); Clotting disorder (HCC); COPD (chronic obstructive pulmonary disease) (HCC); Cushings syndrome (HCC); Depression; Diabetes (HCC); Diabetic neuropathy (HCC); GERD (gastroesophageal reflux disease); Glaucoma; Goiter; Headache(784.0); Heart attack (HCC); Heart murmur; HIV (human immunodeficiency virus infection) ([facility]); Hyperlipidemia; Hypertension; IBD (inflammatory bowel disease); Kidney disease; Meningitis; Migraine; Muscle disorder; Osteoporosis; Parathyroid disorder (HCC); Pituitary disease (HCC); Pulmonary emphysema (HCC); Seizure (HCC); Sickle cell disease (HCC); Stroke (HCC); Substance abuse (HCC); Tuberculosis; Ulcer; or Urinary tract infection, site not specified.  MEDS:   Current Outpatient Prescriptions:   •  MAGNESIUM PO, Take  by mouth., Disp: , Rfl:   ALLERGIES: No Known Allergies  SURGHX:   Past Surgical History:   Procedure Laterality Date   • GYN SURGERY      vaginal cyst removal   • OTHER ORTHOPEDIC SURGERY      left knee x 5, right knee     SOCHX:  reports that she has quit smoking. Her smoking use included Cigarettes. She has a 1.25 pack-year smoking history. She has never used smokeless tobacco. She reports that she drinks alcohol. She reports that she does not  "use drugs..  FH:   Family History   Problem Relation Age of Onset   • Arthritis Mother    • Cancer Other         cancer grandparents   • Other Other         bleeding tendency grandmother; auth with convulsions   • Diabetes Other         nephew and neice         Physical Exam:  Vitals/ General Appearance:   Weight/BMI: Body mass index is 39.51 kg/m².  Blood pressure 110/72, pulse 71, resp. rate 15, height 1.575 m (5' 2\"), weight 98 kg (216 lb), SpO2 97 %, not currently breastfeeding.  Vitals:    11/29/18 1447   BP: 110/72   BP Location: Right arm   Patient Position: Sitting   BP Cuff Size: Adult   Pulse: 71   Resp: 15   SpO2: 97%   Weight: 98 kg (216 lb)   Height: 1.575 m (5' 2\")       Pt. is alert and oriented to time, place and person. Cooperative and in no apparent distress.       1. Head: Atraumatic, normocephalic.   2. Ears: Normal tympanic membrane and no discharge  3. Nose: No inferior turbinate hypertophy  4. Throat: Oropharynx appears crowded in that the palate is overhanging  5. Neck: Supple. No thyromegaly  6. Chest: Trachea central, no spine deformity   7. Lungs auscultation: B/L good air entry, vesicular breath sounds, no adventitious sounds  8. Heart auscultation: 1st and 2nd heart sounds normal, regular rhythm. No appreciable murmur.  9.  Extremities: no clubbing, no pedal edema.  10. Skin: No rash  11. NEUROLOGICAL EXAMINATION: On neurological exam, the patient was alert and oriented x3. speech was clear and fluent without dysarthria.      INVESTIGATIONS:           ASSESSMENT AND PLAN     1. Sleep Apnea (JODIE).    The pathophysiology of sleep anea and the increased risk of cardiovascular morbidity from untreated sleep apnea is discussed in detail with the patient.      She is urged to avoid supine sleep, weight gain and alcoholic beverages since all of these can worsen sleep apnea. She is cautioned against drowsy driving. If She feels sleepy while driving, She must pull over for a break/nap, rather " than persist on the road, in the interest of She own safety and that of others on the road.   Plan   - Since there has been few episodes of gasping while being on CPAP, the pressure is increased wirelessly to CPAP  8 cm   - compliance download was reviewed and discussed with the pt   - compliance was reinforced     2.Regarding treatment of other past medical problems and general health maintenance,  She is urged to follow up with PCP.

## 2018-12-13 ENCOUNTER — OFFICE VISIT (OUTPATIENT)
Dept: NEUROLOGY | Facility: MEDICAL CENTER | Age: 38
End: 2018-12-13
Payer: OTHER GOVERNMENT

## 2018-12-13 VITALS
SYSTOLIC BLOOD PRESSURE: 120 MMHG | HEART RATE: 80 BPM | WEIGHT: 209.22 LBS | BODY MASS INDEX: 38.5 KG/M2 | HEIGHT: 62 IN | RESPIRATION RATE: 14 BRPM | DIASTOLIC BLOOD PRESSURE: 78 MMHG | OXYGEN SATURATION: 98 % | TEMPERATURE: 96.2 F

## 2018-12-13 DIAGNOSIS — G47.33 OSA ON CPAP: ICD-10-CM

## 2018-12-13 DIAGNOSIS — M54.2 NECK PAIN: ICD-10-CM

## 2018-12-13 DIAGNOSIS — Z13.31 SCREENING FOR DEPRESSION: ICD-10-CM

## 2018-12-13 DIAGNOSIS — R20.2 PARESTHESIAS: ICD-10-CM

## 2018-12-13 DIAGNOSIS — R55 SYNCOPE AND COLLAPSE: ICD-10-CM

## 2018-12-13 PROCEDURE — 99214 OFFICE O/P EST MOD 30 MIN: CPT | Performed by: PSYCHIATRY & NEUROLOGY

## 2018-12-13 ASSESSMENT — PATIENT HEALTH QUESTIONNAIRE - PHQ9: CLINICAL INTERPRETATION OF PHQ2 SCORE: 0

## 2018-12-13 NOTE — PROGRESS NOTES
Chief Complaint   Patient presents with   • Follow-Up     Syncope and collapse       Problem List Items Addressed This Visit     None      Visit Diagnoses     Syncope and collapse        Paresthesias        Neck pain        JODIE on CPAP        Screening for depression              Interim History:  Karol Rhodes 38 y.o. female comes in f/u with daughter. No reports of spell. She believes that her one time passing out spell was d/t lack of O2. She does not think she has epilepsy and decided to cancel the EEG that was scheduled for this month. She is not on any seizure medication.     She continues to c/o bilateral upper extremity paresthesia left worse than right. She has a pending EMG schedule on the 19th of this month. She states that the sx keep her up at night.      Still has neck and back pain. She does not feel the need to go to a chiropractor often and she is doing fine. Does not want any intervention.Takes ibuprofen PRN. Does not really want to take meds.     Has JODIE. F/u with [facility] pulmonology. She is on CPAP at night.   F/u with cardiology as needed. No palpitations.     Driving with no issues.     Mood is good. Not suicidal or homicidal    Had MRI of brain and cervical spine at [facility].       Past medical history:   Past Medical History:   Diagnosis Date   • Arthritis    • Fatigue    • History of chickenpox    • Menarche     age 9   • Skipped heart beats    • Sleep difficulties     increased daytime sleepiness   • SOB (shortness of breath)    • Weight gain        Past surgical history:   Past Surgical History:   Procedure Laterality Date   • GYN SURGERY      vaginal cyst removal   • OTHER ORTHOPEDIC SURGERY      left knee x 5, right knee       Family history:   Family History   Problem Relation Age of Onset   • Arthritis Mother    • Cancer Other         cancer grandparents   • Other Other         bleeding tendency grandmother; auth with convulsions   • Diabetes Other         nephew and neice        Social history:   Social History     Social History   • Marital status:      Spouse name: N/A   • Number of children: N/A   • Years of education: N/A     Occupational History   • Not on file.     Social History Main Topics   • Smoking status: Former Smoker     Packs/day: 0.25     Years: 5.00     Types: Cigarettes   • Smokeless tobacco: Never Used      Comment: smoked only socially, off and on over 5yrs; quit quite a few yrs ago   • Alcohol use Yes      Comment: beer and liquor rare use 1-2   • Drug use: No   • Sexual activity: Yes     Partners: Male      Comment: ; moved back here 10/'14 (from [place])  grew up here; 2 step children but they don't live w/ pt and her      Other Topics Concern   • Not on file     Social History Narrative   • No narrative on file       Current medications:   Current Outpatient Prescriptions   Medication   • MAGNESIUM PO     No current facility-administered medications for this visit.        Medication Allergy:  No Known Allergies    Review of systems:   Constitutional: denies fever, night sweats, weight loss.   Eyes: denies acute vision change, eye pain or secretion.   Ears, Nose, Mouth, Throat: denies nasal secretion, nasal bleeding, difficulty swallowing, hearing loss, tinnitus, vertigo, ear pain, acute dental problems, oral ulcers or lesions.   Endocrine: denies recent weight changes, heat or cold intolerance, polyuria, polydypsia, polyphagia,abnormal hair growth.  Cardiovascular: denies new onset of chest pain, palpitations, syncope, or dyspnea of exertion.  Pulmonary: denies shortness of breath, new onset of cough, hemoptysis, wheezing, chest pain or flu-like symptoms.   GI: denies nausea, vomiting, diarrhea, GI bleeding, change in appetite, abdominal pain, and change in bowel habits.  : denies dysuria, urinary incontinence, hematuria.  Dermatologic: denies new rash, or new skin lesions.  Musculoskeletal:denies joint swelling or pain, muscle pain. +neck and  "back pain.   Neurologic: denies headaches, acute visual changes, facial droopiness, muscle weakness (focal or generalized), paresthesias, anesthesia, ataxia, change in speech or language, memory loss, abnormal movements, seizures, loss of consciousness, or episodes of confusion.   Psychiatric: denies symptoms of depression, anxiety, hallucinations, mood swings or changes, suicidal or homicidal thoughts.     Physical examination:   Vitals:    12/13/18 0942   BP: 120/78   BP Location: Left arm   Patient Position: Sitting   BP Cuff Size: Adult   Pulse: 80   Resp: 14   Temp: (!) 35.7 °C (96.2 °F)   TempSrc: Temporal   SpO2: 98%   Weight: 94.9 kg (209 lb 3.5 oz)   Height: 1.575 m (5' 2\")     General: Patient in no acute distress, pleasant and cooperative.  HEENT: Normocephalic, no signs of acute trauma.   Neck: supple, no meningeal signs or carotid bruits. There is normal range of motion. No tenderness on exam.   Chest: clear to auscultation. No cough.   CV: RRR, no murmurs.   Abdomen: soft, non distended or tender.   Skin: no signs of acute rashes or trauma.   Musculoskeletal: joints exhibit full range of motion, without any pain to palpation. There are no signs of joint or muscle swelling. There is no tenderness to deep palpation of muscles.   Psychiatric: No hallucinatory behavior. No symptoms of depression or suicidal ideation. Mood and affect appear normal on exam.      NEUROLOGICAL EXAM:   Mental status, orientation: Awake, alert and fully oriented.   Speech and language: speech is clear and fluent. The patient is able to name, repeat and comprehend.   Memory: There is intact recollection of recent and remote events.   Cranial nerve exam: Pupils are 3-4 mm bilaterally and equally reactive to light and accommodation. Visual fields are intact by confrontation.There is no nystagmus on primary or secondary gaze. Intact full EOM in all directions of gaze. Face appears symmetric. Sensation in the face is intact to light " touch. Uvula is midline. Palate elevates symmetrically. Tongue is midline and without any signs of tongue biting or fasciculations.Sternocleidomastoid muscles exhibit is normal strength bilaterally. Shoulder shrug is intact bilaterally.   Motor exam: Strength is 5/5 in all extremities. Tone is normal. No abnormal movements were seen on exam.   Sensory exam reveals normal sense of light touch in all extremities.   Deep tendon reflexes:  2+ throughout. Plantar responses are flexor. There is no clonus.   Coordination: shows a normal finger-nose-finger. Normal rapidly alternating movements.   Gait: The patient was able to get up from seated position on first attempt without requiring assistance. Found to be steady when walking. Movements were fluid with normal arm swing. The patient was able to turn without difficulties or tendency to fall. Romberg exam unremarkable.     ANCILLARY DATA REVIEWED:     Lab Data Review:  Reviewed in chart. No recent labs     Records reviewed:   Chart reviewed.         Imaging:   MRI brain w/o, 8/9/2018 [facility]:   Unremarkable brain imaging. I personally reviewed images.     MRI cervical spine, 8/9/2018, [facility]:   Mild degenerative changes, I personally reviewed images, no compression or cord lesion.        EEG: None.           ASSESSMENT AND PLAN:    1. Syncope and collapse    2. Paresthesias    3. Neck pain    4. JODIE on CPAP    5. Screening for depression        CLINICAL DISCUSSION:   Many problems. Single syncopal episode, appears vasovagal. H/o palpitations, negative cardiology work up. F/u as needed with cardiology. Pt refused EEG, does not feel is needed.   Chronic neck pain, improved. MRI showed mild disk bulging on C6-7. Pt does not want any interventions nor medication.     MRI brain non lesional. No MS.     R/o bilateral CTS. Still c/o of paresthesias on BUE. Pending EMG on Dec 19th. Will discuss results and treatment plan then.      Chronic back pain with  possible left sciatica, refuses work up, states is better, sees chiropractor for flare ups.    Episode of sleep paralysis may be related to JODIE. On CPAP now at night. Pt f/u with pulmonology.     Mood is good. Not suicidal or homicidal    Driving. Aware she is to stop if she is having dizzy/ passing out spells and immediately report to us.     No other issues. Will f/u as needed        FOLLOW-UP:   Return if symptoms worsen or fail to improve.        EDUCATION AND COUNSELING:  -Education was provided to the patient and/or family regarding diagnosis and prognosis. The chronic and unpredictable nature of the condition were discussed. There is increased risk for additional events, which may carry potential for significant injuries and death.   -We extensively discussed the aspects related to safety in drivers who suffer from epilepsy or syncope. The patient is encourage to report to the Division of Motor Vehicles of any condition and/or spells related to confusion, disorientation, and/or loss of awareness and/or loss of consciousness; as these may pose a safety issue if they occur while operating a motor vehicle. The patient and/or family are ultimately responsible for exercising caution and abiding to regulations in place.   -Other precautions were discussed.  -The patient understands and agrees that due to the complexity of his/her diagnosis, results of any testing and further recommendations will typically be discussed/made during a face to face encounter in my office. The patient and/or family further understands it is their responsibility to keep proper follow up.      Patient agrees with plan, as outlined.       BILLING DOCUMENTATION:   (a) Counseling:  I spent greater than 50% time face-to-face time of a total of 38 mins visit. Over 50% of the time of the visit today was spent on counseling and or coordination of care wtih the patient/family, with greater than 50% of the total time discussing:   o Diagnostic  results, impressions, and/or recommended diagnostic studies, and coordination of care.   o Prognosis.  o Treatment recommendations, including risks, benefits, & alternatives.  o Instructions for treatment/management and/or follow-up.  o Importance of compliance with chosen treatment/management options.  o Risk factor modification.   o Patient & family education.  o Provided business card and/or instructions for follow-up & emergencies.

## 2018-12-19 ENCOUNTER — NON-PROVIDER VISIT (OUTPATIENT)
Dept: NEUROLOGY | Facility: MEDICAL CENTER | Age: 38
End: 2018-12-19
Payer: OTHER GOVERNMENT

## 2018-12-19 DIAGNOSIS — G56.03 CARPAL TUNNEL SYNDROME, BILATERAL: ICD-10-CM

## 2018-12-19 DIAGNOSIS — M54.12 CERVICAL RADICULITIS: ICD-10-CM

## 2018-12-19 DIAGNOSIS — R20.2 PARESTHESIA: ICD-10-CM

## 2018-12-19 PROCEDURE — 99213 OFFICE O/P EST LOW 20 MIN: CPT | Mod: 25 | Performed by: PSYCHIATRY & NEUROLOGY

## 2018-12-19 PROCEDURE — 95886 MUSC TEST DONE W/N TEST COMP: CPT | Performed by: PSYCHIATRY & NEUROLOGY

## 2018-12-19 PROCEDURE — 95910 NRV CNDJ TEST 7-8 STUDIES: CPT | Performed by: PSYCHIATRY & NEUROLOGY

## 2018-12-19 NOTE — PROCEDURES
NERVE CONDUCTION STUDIES AND ELECTROMYOGRAPHY REPORT        DOS: 12/19/18      Referring provider: Bea Monreal D.O.      SUMMARY OF PATIENT'S CLINICAL HISTORY, AND RATIONALE FOR TESTING:    Ms. Karol Rhodes 38 y.o. presenting with bilateral arm paresthesias and pain, worse on the left. Studies for work up of possible bilateral Carpal Tunnel Syndrome vs cervical radiculopathy.     Past Medical History is significant for :   Past Medical History:   Diagnosis Date   • Arthritis    • Fatigue    • History of chickenpox    • Menarche     age 9   • Skipped heart beats    • Sleep difficulties     increased daytime sleepiness   • SOB (shortness of breath)    • Weight gain          ELECTRODIAGNOSTIC EXAMINATION:  Nerve conduction studies (NCS) and electromyography (EMG) are utilized to evaluate direct or indirect damage to the peripheral nervous system. NCS are performed to measure the nerve(s) response(s) to electrostimulation across a given nerve segment. EMG evaluates the passive and active electrical activity of the muscle(s) in question.  Muscles are innervated by specific peripheral nerves and roots. Often times, several nerves the muscle to be examined in order to determine the presence or absence of the disease process. Furthermore, nerves and muscles may need to be tested in a [id number] comparison, as well as in additional extremities, as this may be crucial in characterizing the extent of the disease process, which may be diffuse or isolated and of varying degree of severity. The extent of the neurodiagnostic exam is justified as it may help arrive to a proper diagnosis, which ultimately may contribute to better management of the patient. Therefore, the nerves to muscles examined during the study were medically necessary.    Unless otherwise noted, temperature of the extremity(s) study was monitored before and during the examination and remained between 32 and 36 degrees C for the upper extremities, and  between 30 and 36 degrees C for the lower extremities.      NERVE CONDUCTION STUDIES:  Sensory nerves:   - Bilateral Median sensory nerves were examined. Moderately decreased conduction velocity in both nerves. The left median nerve also exhibited a mildly prolonged latency.   - Bilateral Ulnar sensory nerves were examined. The response was within normal limits on the right ulnar nerve, but the left ulnar nerve showed a moderately decreased conduction velocity and a mildly prolonged latency.    Motor nerves:   - Bilateral Median motor nerves were examined. Recording electrodes placed at the Abductor Pollicis Brevis muscles. The responses were within normal limits.  - Bilateral Ulnar motor nerves were examined. Recording electrodes placed at the Abductor Digiti Minimi muscles. The responses were within normal limits.    No temporal dispersion or conduction block observed in any of the motor nerves examined.    LATE RESPONSES:  F waves were assessed in the following nerves: bilateral median, and bilateral ulnar.   The responses within normal limits for the patient's age.      ELECTROMYOGRAPHY:  The study was performed the concentric needle electrode. Fibrillation and fasciculation activity is graded by convention from none (0) to continuous (4+).  Needle electrode examination was performed in the following muscles: left deltoid, left biceps, left triceps, left abductor digiti minimi, and left abductor pollicis brevis.   The muscles tested demonstrated normal inserctional activity, normal motor unit morphology and recruitment, and except for diminished recruitment of the left abductor pollicis brevis. There were no elements suggestive of active denervation.    The patient tolerated testing well, without any complications.         Nerve Conduction Studies     Stim Site NR Peak (ms) Norm Peak (ms) O-P Amp (µV) Norm O-P Amp Site1 Site2 Delta-P (ms) Dist (cm) Edgar (m/s) Norm Edgar (m/s)   Left Median Anti Sensory (2nd Digit)   35.6°C   Wrist    *3.5 <3.4 50.9 >20 Wrist 2nd Digit 3.5 14.0 *40 >50   Right Median Anti Sensory (2nd Digit)  35.6°C   Wrist    3.3 <3.4 47.3 >20 Wrist 2nd Digit 3.3 14.0 *42 >50   Left Ulnar Anti Sensory (5th Digit)  35.6°C   Wrist    *3.4 <3.1 32.2 >12 Wrist 5th Digit 3.4 14.0 *41 >50   Right Ulnar Anti Sensory (5th Digit)  35.6°C   Wrist    2.8 <3.1 39.6 >12 Wrist 5th Digit 2.8 14.0 50 >50        Stim Site NR Onset (ms) Norm Onset (ms) O-P Amp (mV) Norm O-P Amp Site1 Site2 Delta-0 (ms) Dist (cm) Edgar (m/s) Norm Edgar (m/s)   Left Median Motor (Abd Poll Brev)  35.6°C   Wrist    3.5 <3.9 10.6 >6 Elbow Wrist 3.9 24.0 62 >50   Elbow    7.4  8.7          Right Median Motor (Abd Poll Brev)   Wrist    3.8 <3.9 11.3 >6 Elbow Wrist 3.8 25.0 66 >50   Elbow    7.6  10.2          Left Ulnar Motor (Abd Dig Min)  35.6°C   Wrist    3.0 <3.1 10.9 >7 B Elbow Wrist 2.9 17.0 59 >50   B Elbow    5.9  9.7  A Elbow B Elbow 1.6 10.0 63    A Elbow    7.5  7.4          Right Ulnar Motor (Abd Dig Min)  35.6°C   Wrist    2.8 <3.1 10.2 >7 B Elbow Wrist 2.8 18.0 64 >50   B Elbow    5.6  8.7  A Elbow B Elbow 1.5 10.0 67    A Elbow    7.1  8.6            F Wave Studies     NR F-Lat (ms) Lat Norm (ms)   Left Median (Abd Poll Brev)  35.6°C      23.79 <31   Right Median (Abd Poll Brev)      25.31 <31   Left Ulnar (Abd Dig Min)  35.6°C      24.38 <32   Right Ulnar (Abd Dig Min)  35.6°C      24.36 <32                                     Electromyography     Side Muscle Nerve Root Ins Act Fibs Psw Amp Dur Poly Recrt Int Pat Comment   Left Deltoid Axillary C5-6 Nml Nml Nml Nml Nml 0 Nml Nml    Left Biceps Musculocut C5-6 Nml Nml Nml Nml Nml 0 Nml Nml    Left Triceps Radial C6-7-8 Nml Nml Nml Nml Nml 0 Nml Nml    Left Abd Poll Brev Median C8-T1 Nml Nml Nml Nml Nml 0 Diminished Nml    Left Abd Dig Min Ulnar C8-T1 Nml Nml Nml Nml Nml 0 Nml Nml          DIAGNOSTIC INTERPRETATION:   Extensive electrodiagnostic studies were performed to the upper  extremities. The studies were abnormal.       DISCUSSION:   1)-Moderate left Median Neuropathy, not localizable, but likely at the wrist (Carpal Tunnel Syndrome).   2)-Mild right Median Neuropathy, not localizable, but likely at the wrist (Carpal Tunnel Syndrome).       PLAN:   I discussed results with patient at length. I recommend she is evaluated by hand surgery (Dr. Morales) for possible left CTS decompression. Referral given. I also recommend she wears wrist splints at night. She will follow up with us on a PRN basis.     The patient agreed with plan.     I spent separate 20 minutes from testing time, in discussing results with patient and providing diagnosis, prognosis and management recommendations.       Chilo Kitchen MD  Medical Director, Epilepsy and Neurodiagnostics.   Clinical  of Neurology [facility] [employer].   Diplomate in Neurology, Epilepsy, and Electrodiagnostic Medicine.   Office: [phone]  Fax: [phone]

## 2019-05-29 ENCOUNTER — APPOINTMENT (OUTPATIENT)
Dept: SLEEP MEDICINE | Facility: MEDICAL CENTER | Age: 39
End: 2019-05-29
Payer: OTHER GOVERNMENT

## 2019-06-28 ENCOUNTER — SLEEP CENTER VISIT (OUTPATIENT)
Dept: SLEEP MEDICINE | Facility: MEDICAL CENTER | Age: 39
End: 2019-06-28
Payer: OTHER GOVERNMENT

## 2019-06-28 VITALS
RESPIRATION RATE: 15 BRPM | BODY MASS INDEX: 35.88 KG/M2 | WEIGHT: 195 LBS | OXYGEN SATURATION: 97 % | SYSTOLIC BLOOD PRESSURE: 110 MMHG | HEIGHT: 62 IN | DIASTOLIC BLOOD PRESSURE: 70 MMHG | HEART RATE: 66 BPM

## 2019-06-28 DIAGNOSIS — G47.33 OSA (OBSTRUCTIVE SLEEP APNEA): ICD-10-CM

## 2019-06-28 PROCEDURE — 99213 OFFICE O/P EST LOW 20 MIN: CPT | Performed by: FAMILY MEDICINE

## 2019-06-28 NOTE — PROGRESS NOTES
[facility] Follow Up Note     Date: 6/28/2019 / Time: 8:50 AM    Patient ID:   Name:             Karol Rhodes     YOB: 1980  Age:                 39 y.o.  female   MRN:               [id number]      Thank you for requesting a sleep medicine consultation on Karol Rhodes at the sleep center. She presents today with the chief complaints of JODIE follow up.     HISTORY OF PRESENT ILLNESS:       Pt is currently on CPAP 8 cm.denies any changes in sleep or significant change in medical hx. She goes to sleep around 10 pm-12 am and wakes up around 6-7:30 am. She is getting about 7 hrs of sleep on a good night. She rarely has bad nights. Overall, she does finds her sleep refreshing. When She  wakes up in the morning, She does not feel tired. She doesnt take regular naps.     She is using CPAP most days of the week. Pt reports 7.5 hrs of average nightly use of CPAP. Pt denies snoring, gasping,choking.Pt also denies significant mask leak that is interfering with sleep. The 30 day compliance was downloaded which shows adequate compliance with more that 4 hr usage about 100%. The AHI is has improved to 1/hr. The mask leak is normal. The symptoms of excessive daytime, snoring and gasping has improved.         SLEEP HISTORY   Split night: The apnea hypopnea index was 17.8, the RDI was 17.. The edwar saturation during sleep was 86 % and the patient spent 6.2% of the time with saturations less than or equal to 90%.initiated treatment with CPAP set at 4 cmH2O and increased the pressure to a maximum of 7 cmH2O.The patient did best on CPAP at 7 cm H2O with a resultant AHI of 3.9, a minimum saturation of 90.0 %, and a mean saturation of 94.9 %.      REVIEW OF SYSTEMS:       Constitutional: Denies fevers, Denies weight changes  Eyes: Denies changes in vision, no eye pain  Ears/Nose/Throat/Mouth: Denies nasal congestion or sore throat   Cardiovascular: Denies chest pain or palpitations   Respiratory:  Denies shortness of breath , Denies cough  Gastrointestinal/Hepatic: Denies abdominal pain, nausea, vomiting, diarrhea, constipation or GI bleeding   Genitourinary: Deniesdysuria or frequency  Musculoskeletal/Rheum: Denies  joint pain and swelling   Skin/Breast: Denies rash,   Neurological: Denies headache, confusion, memory loss or focal weakness/parasthesias  Psychiatric: denies mood disorder   Sleep: denies snoring and apneas     Comprehensive review of systems form is reviewed with the patient and is attached in the EMR.     PMH:  has a past medical history of Arthritis; Fatigue; History of chickenpox; Menarche; Skipped heart beats; Sleep difficulties; SOB (shortness of breath); and Weight gain. She also has no past medical history of Addisons disease (HCC); Adrenal disorder (HCC); Allergy; Anemia; Anxiety; Arrhythmia; Asthma; Blood transfusion without reported diagnosis; Cancer (HCC); Cataract; Change in weight; CHF (congestive heart failure) ([facility]); Clotting disorder (HCC); COPD (chronic obstructive pulmonary disease) ([facility]); Cushings syndrome ([facility]); Depression; Diabetes (HCC); Diabetic neuropathy (HCC); GERD (gastroesophageal reflux disease); Glaucoma; Goiter; Headache(784.0); Heart attack (HCC); Heart murmur; HIV (human immunodeficiency virus infection) ([facility]); Hyperlipidemia; Hypertension; IBD (inflammatory bowel disease); Kidney disease; Meningitis; Migraine; Muscle disorder; Osteoporosis; Parathyroid disorder (HCC); Pituitary disease (HCC); Pulmonary emphysema (HCC); Seizure (HCC); Sickle cell disease (HCC); Stroke (HCC); Substance abuse ([facility]); Tuberculosis; Ulcer; or Urinary tract infection, site not specified.  MEDS:   Current Outpatient Prescriptions:   •  MAGNESIUM PO, Take  by mouth., Disp: , Rfl:   •  ibuprofen (MOTRIN) 600 MG Tab, , Disp: , Rfl: 0  ALLERGIES: No Known Allergies  SURGHX:   Past Surgical History:   Procedure Laterality Date   • GYN SURGERY      vaginal cyst removal   • OTHER ORTHOPEDIC  "SURGERY      left knee x 5, right knee     SOCHX:  reports that she has quit smoking. Her smoking use included Cigarettes. She has a 1.25 pack-year smoking history. She has never used smokeless tobacco. She reports that she drinks alcohol. She reports that she does not use drugs..  FH:   Family History   Problem Relation Age of Onset   • Arthritis Mother    • Cancer Other         cancer grandparents   • Other Other         bleeding tendency grandmother; auth with convulsions   • Diabetes Other         nephew and neice         Physical Exam:  Vitals/ General Appearance:   Weight/BMI: Body mass index is 35.67 kg/m².  /70 (BP Location: Left arm, Patient Position: Sitting, BP Cuff Size: Adult)   Pulse 66   Resp 15   Ht 1.575 m (5' 2\")   Wt 88.5 kg (195 lb)   SpO2 97%   Vitals:    06/28/19 0853   BP: 110/70   BP Location: Left arm   Patient Position: Sitting   BP Cuff Size: Adult   Pulse: 66   Resp: 15   SpO2: 97%   Weight: 88.5 kg (195 lb)   Height: 1.575 m (5' 2\")       Pt. is alert and oriented to time, place and person. Cooperative and in no apparent distress.       -Head: Atraumatic, normocephalic.   -. Ears: Normal tympanic membrane and no discharge  -. Nose: No inferior turbinate hypertophy  ,-. Throat: Oropharynx appears crowded in that the palate is overhanging   -. Neck: Supple. No thyromegaly  -. Chest: Trachea central, no spine deformity   -. Lungs auscultation: B/L good air entry, vesicular breath sounds, no adventitious sounds  -. Heart auscultation: 1st and 2nd heart sounds normal, regular rhythm. No appreciable murmur.  - Extremities:  no pedal edema.rash  - NEUROLOGICAL EXAMINATION: On neurological exam, the patient was alert and oriented x3. speech was clear and fluent without dysarthria.      ASSESSMENT AND PLAN     1.Obstructive Sleep Apnea    The pathophysiology of sleep anea and the increased risk of cardiovascular morbidity from untreated sleep apnea is discussed in detail with the " patient.     She is urged to avoid supine sleep, weight gain and alcoholic beverages since all of these can worsen sleep apnea. She is cautioned against drowsy driving. If She feels sleepy while driving, She must pull over for a break/nap, rather than persist on the road, in the interest of She own safety and that of others on the road.   Plan   - Continue CPAP at 8 cm with amaraview  mask    - dreamwear was given to try    - compliance download was reviewed and discussed with the pt   - compliance was reinforced     2. Regarding treatment of other past medical problems and general health maintenance,  She is urged to follow up with PCP.

## 2019-07-08 ENCOUNTER — PATIENT MESSAGE (OUTPATIENT)
Dept: SLEEP MEDICINE | Facility: MEDICAL CENTER | Age: 39
End: 2019-07-08

## 2019-07-08 DIAGNOSIS — G47.33 OSA (OBSTRUCTIVE SLEEP APNEA): ICD-10-CM

## 2019-07-10 ENCOUNTER — APPOINTMENT (OUTPATIENT)
Dept: SLEEP MEDICINE | Facility: MEDICAL CENTER | Age: 39
End: 2019-07-10
Payer: OTHER GOVERNMENT

## 2019-10-16 ENCOUNTER — PATIENT MESSAGE (OUTPATIENT)
Dept: SLEEP MEDICINE | Facility: MEDICAL CENTER | Age: 39
End: 2019-10-16

## 2019-10-17 ENCOUNTER — PATIENT MESSAGE (OUTPATIENT)
Dept: SLEEP MEDICINE | Facility: MEDICAL CENTER | Age: 39
End: 2019-10-17

## 2019-10-17 DIAGNOSIS — G47.33 OSA (OBSTRUCTIVE SLEEP APNEA): ICD-10-CM

## 2020-07-06 ENCOUNTER — TELEMEDICINE (OUTPATIENT)
Dept: SLEEP MEDICINE | Facility: MEDICAL CENTER | Age: 40
End: 2020-07-06
Payer: OTHER GOVERNMENT

## 2020-07-06 VITALS — WEIGHT: 206 LBS | BODY MASS INDEX: 36.5 KG/M2 | HEIGHT: 63 IN

## 2020-07-06 DIAGNOSIS — G47.33 OSA (OBSTRUCTIVE SLEEP APNEA): ICD-10-CM

## 2020-07-06 PROCEDURE — 99213 OFFICE O/P EST LOW 20 MIN: CPT | Mod: GT,CR | Performed by: FAMILY MEDICINE

## 2020-07-06 NOTE — PROGRESS NOTES
Telemedicine Visit: Established Patient     This encounter was conducted via Zoom . Verbal consent was obtained. Patient's identity was verified.       Given the importance of social distancing and other strategies recommended to reduce the risk of COVID-19 transmission, I am providing medical care to this patient via audio/video visit in place of an in person visit at the request of the patient.    [facility] Follow Up Note     Date: 7/6/2020 / Time: 10:42 AM    Patient ID:   Name:             Karol Rhodes     YOB: 1980  Age:                 40 y.o.  female   MRN:               [id number]      Thank you for requesting a sleep medicine consultation on Karol Rhodes at the sleep center. She presents today with the chief complaints of JODIE yearly follow up.     HISTORY OF PRESENT ILLNESS:       Pt is currently on CPAP 8 cm. She goes to sleep around 11 pm-12 am and wakes up around 6:30-8 am. She is getting about 7-8 hrs of sleep on a good night and about 6 hr of sleep on a bad night. The bad nights are rare per week. Overall, she does finds her sleep refreshing. She does not take regular naps. Denies any other sleep disorder or use of the sleep aids.    She is using CPAP most days of the week. Pt reports 7 hrs of average nightly use of CPAP. Pt denies snoring, gasping,choking.Last year she was miguel mask leak issues however it has improved since has switched to nasl pillow mask. Pt also denies significant mask leak that is interfering with sleep. The 30 day compliance was downloaded which shows adequate compliance with more that 4 hr usage about 100%. The AHI is has improved to 0.5/hr. The mask leak is normal. The symptoms of JODIE are well controlled.         REVIEW OF SYSTEMS:       Constitutional: Denies fevers, Denies weight changes  Eyes: Denies changes in vision, no eye pain  Ears/Nose/Throat/Mouth: Denies nasal congestion or sore throat   Cardiovascular: Denies chest pain or  palpitations   Respiratory: Denies shortness of breath , Denies cough  Gastrointestinal/Hepatic: Denies abdominal pain, nausea, vomiting, diarrhea, constipation or GI bleeding   Genitourinary: Deniesdysuria or frequency  Musculoskeletal/Rheum: Denies  joint pain and swelling   Skin/Breast: Denies rash,   Neurological: Denies headache, confusion, memory loss or focal weakness/parasthesias  Psychiatric: denies mood disorder   Sleep: denies snoring and EDS    Comprehensive review of systems form is reviewed with the patient and is attached in the EMR.     PMH:  has a past medical history of Arthritis, Fatigue, History of chickenpox, Menarche, Skipped heart beats, Sleep difficulties, SOB (shortness of breath), and Weight gain. She also has no past medical history of Addisons disease (HCC), Adrenal disorder (HCC), Allergy, Anemia, Anxiety, Arrhythmia, Asthma, Blood transfusion without reported diagnosis, Cancer (HCC), Cataract, Change in weight, CHF (congestive heart failure) ([facility]), Clotting disorder (HCC), COPD (chronic obstructive pulmonary disease) (HCC), Cushings syndrome (HCC), Depression, Diabetes (HCC), Diabetic neuropathy (HCC), GERD (gastroesophageal reflux disease), Glaucoma, Goiter, Headache(784.0), Heart attack (HCC), Heart murmur, HIV (human immunodeficiency virus infection) ([facility]), Hyperlipidemia, Hypertension, IBD (inflammatory bowel disease), Kidney disease, Meningitis, Migraine, Muscle disorder, Osteoporosis, Parathyroid disorder (HCC), Pituitary disease (HCC), Pulmonary emphysema (HCC), Seizure (HCC), Sickle cell disease (HCC), Stroke ([facility]), Substance abuse ([facility]), Tuberculosis, Ulcer, or Urinary tract infection, site not specified.  MEDS:   Current Outpatient Medications:   •  VITAMIN D PO, Take  by mouth., Disp: , Rfl:   •  ibuprofen (MOTRIN) 600 MG Tab, , Disp: , Rfl: 0  •  MAGNESIUM PO, Take  by mouth., Disp: , Rfl:   ALLERGIES: No Known Allergies  SURGHX:   Past Surgical History:   Procedure  "Laterality Date   • GYN SURGERY      vaginal cyst removal   • OTHER ORTHOPEDIC SURGERY      left knee x 5, right knee     SOCHX:  reports that she has quit smoking. Her smoking use included cigarettes. She has a 1.25 pack-year smoking history. She has never used smokeless tobacco. She reports current alcohol use. She reports that she does not use drugs..  FH:   Family History   Problem Relation Age of Onset   • Arthritis Mother    • Cancer Other         cancer grandparents   • Other Other         bleeding tendency grandmother; auth with convulsions   • Diabetes Other         nephew and neice         Physical Exam:  Vitals/ General Appearance:   Weight/BMI: Body mass index is 36.49 kg/m².  Ht 1.6 m (5' 3\")   Wt 93.4 kg (206 lb)   Vitals:    07/06/20 1030   Weight: 93.4 kg (206 lb)   Height: 1.6 m (5' 3\")       Pt. is alert and oriented to time, place and person. Cooperative and in no apparent distress.       Constitutional: Alert, no distress, well-groomed.  Skin: No rashes in visible areas.  Eye: Round. Conjunctiva clear, lids normal. No icterus.   ENMT: Lips pink without lesions, good dentition, moist mucous membranes. Phonation normal.  Neck: No masses, no thyromegaly. Moves freely without pain.  CV: Pulse as reported by patient  Respiratory: Unlabored respiratory effort, no cough or audible wheeze  Psych: Alert and oriented x3, normal affect and mood.     ASSESSMENT AND PLAN     1. Sleep Apnea    She is urged to avoid supine sleep, weight gain and alcoholic beverages since all of these can worsen sleep apnea. She is cautioned against drowsy driving. If She feels sleepy while driving, She must pull over for a break/nap, rather than persist on the road, in the interest of She own safety and that of others on the road.   Plan   - Continue CPAP 8 cm with nasal pillow mask    - supplies are refilled . Sleep hygiene was dicussed    - compliance download was reviewed and discussed with the pt   - compliance was " reinforced     2. Regarding treatment of other past medical problems and general health maintenance,  She is urged to follow up with PCP.

## 2020-07-06 NOTE — PATIENT INSTRUCTIONS
"        CPAP and BPAP Information  CPAP and BPAP are methods of helping a person breathe with the use of air pressure. CPAP stands for \"continuous positive airway pressure.\" BPAP stands for \"bi-level positive airway pressure.\" In both methods, air is blown through your nose or mouth and into your air passages to help you breathe well.  CPAP and BPAP use different amounts of pressure to blow air. With CPAP, the amount of pressure stays the same while you breathe in and out. With BPAP, the amount of pressure is increased when you breathe in (inhale) so that you can take larger breaths. Your health care provider will recommend whether CPAP or BPAP would be more helpful for you.  Why are CPAP and BPAP treatments used?  CPAP or BPAP can be helpful if you have:  · Sleep apnea.  · Chronic obstructive pulmonary disease (COPD).  · Heart failure.  · Medical conditions that weaken the muscles of the chest including muscular dystrophy, or neurological diseases such as amyotrophic lateral sclerosis (ALS).  · Other problems that cause breathing to be weak, abnormal, or difficult.  CPAP is most commonly used for obstructive sleep apnea (JODIE) to keep the airways from collapsing when the muscles relax during sleep.  How is CPAP or BPAP administered?  Both CPAP and BPAP are provided by a small machine with a flexible plastic tube that attaches to a plastic mask. You wear the mask. Air is blown through the mask into your nose or mouth. The amount of pressure that is used to blow the air can be adjusted on the machine. Your health care provider will determine the pressure setting that should be used based on your individual needs.  When should CPAP or BPAP be used?  In most cases, the mask only needs to be worn during sleep. Generally, the mask needs to be worn throughout the night and during any daytime naps. People with certain medical conditions may also need to wear the mask at other times when they are awake. Follow instructions " from your health care provider about when to use the machine.  What are some tips for using the mask?    · Because the mask needs to be snug, some people feel trapped or closed-in (claustrophobic) when first using the mask. If you feel this way, you may need to get used to the mask. One way to do this is by holding the mask loosely over your nose or mouth and then gradually applying the mask more snugly. You can also gradually increase the amount of time that you use the mask.  · Masks are available in various types and sizes. Some fit over your mouth and nose while others fit over just your nose. If your mask does not fit well, talk with your health care provider about getting a different one.  · If you are using a mask that fits over your nose and you tend to breathe through your mouth, a chin strap may be applied to help keep your mouth closed.  · The CPAP and BPAP machines have alarms that may sound if the mask comes off or develops a leak.  · If you have trouble with the mask, it is very important that you talk with your health care provider about finding a way to make the mask easier to tolerate. Do not stop using the mask. Stopping the use of the mask could have a negative impact on your health.  What are some tips for using the machine?  · Place your CPAP or BPAP machine on a secure table or stand near an electrical outlet.  · Know where the on/off switch is located on the machine.  · Follow instructions from your health care provider about how to set the pressure on your machine and when you should use it.  · Do not eat or drink while the CPAP or BPAP machine is on. Food or fluids could get pushed into your lungs by the pressure of the CPAP or BPAP.  · Do not smoke. Tobacco smoke residue can damage the machine.  · For home use, CPAP and BPAP machines can be rented or purchased through home health care companies. Many different brands of machines are available. Renting a machine before purchasing may help  you find out which particular machine works well for you.  · Keep the CPAP or BPAP machine and attachments clean. Ask your health care provider for specific instructions.  Get help right away if:  · You have redness or open areas around your nose or mouth where the mask fits.  · You have trouble using the CPAP or BPAP machine.  · You cannot tolerate wearing the CPAP or BPAP mask.  · You have pain, discomfort, and bloating in your abdomen.  Summary  · CPAP and BPAP are methods of helping a person breathe with the use of air pressure.  · Both CPAP and BPAP are provided by a small machine with a flexible plastic tube that attaches to a plastic mask.  · If you have trouble with the mask, it is very important that you talk with your health care provider about finding a way to make the mask easier to tolerate.  This information is not intended to replace advice given to you by your health care provider. Make sure you discuss any questions you have with your health care provider.  Document Released: 09/15/2005 Document Revised: 04/08/2020 Document Reviewed: 11/06/2017  Elsevier Patient Education © 2020 [employer]

## 2021-07-15 ENCOUNTER — TELEMEDICINE (OUTPATIENT)
Dept: SLEEP MEDICINE | Facility: MEDICAL CENTER | Age: 41
End: 2021-07-15
Payer: OTHER GOVERNMENT

## 2021-07-15 VITALS — WEIGHT: 220 LBS | HEIGHT: 63 IN | BODY MASS INDEX: 38.98 KG/M2

## 2021-07-15 DIAGNOSIS — E66.9 OBESITY (BMI 35.0-39.9 WITHOUT COMORBIDITY): ICD-10-CM

## 2021-07-15 DIAGNOSIS — G47.33 OSA (OBSTRUCTIVE SLEEP APNEA): ICD-10-CM

## 2021-07-15 PROCEDURE — 99213 OFFICE O/P EST LOW 20 MIN: CPT | Mod: GT,95 | Performed by: NURSE PRACTITIONER

## 2021-07-15 RX ORDER — HYALURONATE SODIUM 10 MG/ML
SYRINGE (ML) INTRAARTICULAR
COMMUNITY
Start: 2021-05-05 | End: 2022-08-16

## 2021-07-15 ASSESSMENT — FIBROSIS 4 INDEX: FIB4 SCORE: 0.57

## 2021-07-15 NOTE — PATIENT INSTRUCTIONS
1. Continue using CPAP at 8 cm/H2O.  Clean mask and supplies frequently with mild soap and water or vinegar and water solution.  Avoid ozone type  as they may degrade the plastics more rapidly than traditional .  In regards to humidification, monitor for symptoms including dry mouth or nose bleed or upper airway irritation.  You can use products such as AYT nasal gel or Biotene mouthwash if these symptoms do occur.  Follow-up will be in 1 year or sooner if needed.  Reach out via [employer] with any questions or concerns before next appointment.

## 2022-08-16 ENCOUNTER — TELEMEDICINE (OUTPATIENT)
Dept: SLEEP MEDICINE | Facility: MEDICAL CENTER | Age: 42
End: 2022-08-16
Payer: OTHER GOVERNMENT

## 2022-08-16 VITALS — BODY MASS INDEX: 38.09 KG/M2 | WEIGHT: 215 LBS | HEIGHT: 63 IN

## 2022-08-16 DIAGNOSIS — G47.33 OSA (OBSTRUCTIVE SLEEP APNEA): ICD-10-CM

## 2022-08-16 PROCEDURE — 99213 OFFICE O/P EST LOW 20 MIN: CPT | Mod: 95,GT | Performed by: NURSE PRACTITIONER

## 2022-08-16 NOTE — PROGRESS NOTES
Virtual Visit: Established Patient   This visit was conducted via Zoom using secure and encrypted videoconferencing technology.   The patient was in their home in the [facility].    The patient's identity was confirmed and verbal consent was obtained for this virtual visit.     Subjective:   CC:   Chief Complaint   Patient presents with    Apnea       Karol Rhodes is a 42 y.o. female presenting for evaluation and management of:    JODIE annual follow-up.  Seen by me via telemedicine on 7/15/2021.  She is currently on CPAP at 8 cm/H2O.  Patient is currently using a ResMed AirFit F 30 mask and denies any difficulty with mask fit or pressures.  She sleeps on average 7 to 8 hours nightly, but will wake up at times due to her mind racing.  She denies any difficulty falling asleep, but there are periods of time where she wakes up and cannot go back to sleep for approximately an hour and a half.  She does sleep the CPAP in place.  On occasion she does wake up tired.  She denies any excessive daytime sleepiness, morning headaches, palpitations, concentration or memory problems.  Her ResMed device was initially set up on 6/18.    Compliance report was reviewed with patient and does show 100% use with an average time of 8 hours and 15 minutes and a resultant AHI of 0.8 on CPAP at 8 cm/H2O with no evidence of excessive leakage.    Original split-night sleep study was done on May 21, 2018 with an AHI of 17.8.  She was initially titrated to a CPAP at 7 cm/H2O.    ROS   As per HPI    Current medicines (including changes today)  Current Outpatient Medications   Medication Sig Dispense Refill    VITAMIN D PO Take  by mouth.      TRIVISC 25 MG/2.5ML Solution Prefilled Syringe INJECT ONCE WEEKLY INTRA ARTICULAR OF INJECTION OF THE KNEE (Patient not taking: Reported on 7/15/2021)      ibuprofen (MOTRIN) 600 MG Tab  (Patient not taking: Reported on 7/15/2021)  0    MAGNESIUM PO Take  by mouth.       No current  "facility-administered medications for this visit.       Patient Active Problem List    Diagnosis Date Noted    Obesity (BMI 35.0-39.9 without comorbidity) (HCC) 06/06/2018    JODIE (obstructive sleep apnea) 04/23/2018    Obesity (BMI 30-39.9) 12/28/2017    Primary osteoarthritis of left knee 10/19/2016    Fatigue 04/23/2015        Objective:   Ht 1.6 m (5' 3\")   Wt 97.5 kg (215 lb)   BMI 38.09 kg/m²     Physical Exam:  Constitutional: Alert, no distress, well-groomed.  Skin: No rashes in visible areas.  Eye: Round. Conjunctiva clear, lids normal. No icterus.   ENMT: Lips pink without lesions, good dentition, moist mucous membranes. Phonation normal.  Neck: No masses, no thyromegaly. Moves freely without pain.  Respiratory: Unlabored respiratory effort, no cough or audible wheeze  Psych: Alert and oriented x3, normal affect and mood.     Assessment and Plan:   The following treatment plan was discussed:     1. JODIE (obstructive sleep apnea)  - DME Mask and Supplies    Patient is using and benefiting from CPAP therapy at this time.  Compliance shows adequate use and control of JODIE.  Order placed for mask and supplies which will be good for 1 year.  Advised annual follow-up, but can be seen sooner if needed.        Follow-up: Return in about 1 year (around 8/16/2023), or if symptoms worsen or fail to improve.          "

## 2023-04-26 PROBLEM — M17.12 UNILATERAL PRIMARY OSTEOARTHRITIS, LEFT KNEE: Status: ACTIVE | Noted: 2023-04-26

## 2023-08-03 ENCOUNTER — OFFICE VISIT (OUTPATIENT)
Dept: SLEEP MEDICINE | Facility: MEDICAL CENTER | Age: 43
End: 2023-08-03
Attending: NURSE PRACTITIONER
Payer: OTHER GOVERNMENT

## 2023-08-03 VITALS
HEART RATE: 74 BPM | SYSTOLIC BLOOD PRESSURE: 114 MMHG | WEIGHT: 232 LBS | BODY MASS INDEX: 41.11 KG/M2 | HEIGHT: 63 IN | RESPIRATION RATE: 16 BRPM | DIASTOLIC BLOOD PRESSURE: 60 MMHG | OXYGEN SATURATION: 98 %

## 2023-08-03 DIAGNOSIS — G47.33 OSA (OBSTRUCTIVE SLEEP APNEA): ICD-10-CM

## 2023-08-03 PROCEDURE — 99212 OFFICE O/P EST SF 10 MIN: CPT | Performed by: NURSE PRACTITIONER

## 2023-08-03 PROCEDURE — 3074F SYST BP LT 130 MM HG: CPT | Performed by: NURSE PRACTITIONER

## 2023-08-03 PROCEDURE — 99214 OFFICE O/P EST MOD 30 MIN: CPT | Performed by: NURSE PRACTITIONER

## 2023-08-03 PROCEDURE — 3078F DIAST BP <80 MM HG: CPT | Performed by: NURSE PRACTITIONER

## 2023-08-03 ASSESSMENT — PATIENT HEALTH QUESTIONNAIRE - PHQ9: CLINICAL INTERPRETATION OF PHQ2 SCORE: 0

## 2023-08-03 NOTE — PROGRESS NOTES
Chief Complaint   Patient presents with    Apnea       HPI:  Karol Rhodes is a 43 y.o. year old female here today for follow-up on JODIE.  Last seen 8/16/2022 by me. She is currently on CPAP at 8 cm/H2O.  Patient is currently using a ResMed AirFit F 30 mask and denies any difficulty with mask fit or pressures.  She sleeps on average 7 to 8 hours nightly, but will wake up at times due to her mind racing.  She denies any difficulty falling asleep, but there are periods of time where she wakes up and cannot go back to sleep for approximately an hour and a half. On occasion she does wake up tired.  She denies any excessive daytime sleepiness, morning headaches, palpitations, concentration or memory problems.  Patient does wake up gasping at times.  She feels that the machine shuts itself off and then restarts.  Her device is greater than 5 years old.  Patient also endorses waking up with headaches and palpitations.  Her ResMed device was initially set up on 6/18.    30-day compliance reviewed with patient shows 100% use with an average time of 7 hours and 48 minutes and a resultant AHI of 0.9.  Current CPAP is set to 8 cm/H2O.  And there is no evidence of mask leak.  Sleep history:  Original split-night sleep study was done on May 21, 2018 with an AHI of 17.8.  She was initially titrated to a CPAP at 7 cm/H2O.    ROS: As per HPI and otherwise negative if not stated.    Past Medical History:   Diagnosis Date    Arthritis     Bronchitis     Dental disorder     lower partial retainer    Fatigue     History of chickenpox     Infectious disease     covid 2020    Menarche     age 9    Pain     left knee    PONV (postoperative nausea and vomiting)     Skipped heart beats     Sleep apnea     Sleep difficulties     increased daytime sleepiness    SOB (shortness of breath)     Weight gain        Past Surgical History:   Procedure Laterality Date    PB TOTAL KNEE ARTHROPLASTY Left 4/26/2023    Procedure: LEFT TOTAL KNEE  ARTHROPLASTY;  Surgeon: Benedicto Martinez M.D.;  Location: SURGERY [facility], [place];  Service: Orthopedics    GYN SURGERY      vaginal cyst removal    OTHER ORTHOPEDIC SURGERY      left knee x 5, right knee       Family History   Problem Relation Age of Onset    Arthritis Mother     Cancer Other         cancer grandparents    Other Other         bleeding tendency grandmother; auth with convulsions    Diabetes Other         nephew and neice       Allergies as of 08/03/2023 - Reviewed 04/26/2023   Allergen Reaction Noted    Trace metals Rash 04/19/2023    Iodine contrast [diagnostic x-ray materials] Rash 09/13/2022        Vitals:  There were no vitals taken for this visit.    Current medications as of today   Current Outpatient Medications   Medication Sig Dispense Refill    Cholecalciferol (VITAMIN D3) 1.25 MG (88801 UT) Cap Take 10,000 Int'l Units/day by mouth every day.      Cyanocobalamin (VITAMIN B-12) 2500 MCG SL Tab Place  under the tongue 3 times a week.      Omega-3 Fatty Acids (FISH OIL) 435 MG Cap Take  by mouth every day.      MAGNESIUM OXIDE PO Take  by mouth every day.      Probiotic Product (PROBIOTIC PO) Take  by mouth every day.      Menaquinone-7 (VITAMIN K2 PO) Take  by mouth every day.      benzonatate (TESSALON) 100 MG Cap Take 1 Capsule by mouth 3 times a day as needed for Cough. 60 Capsule 0    VITAMIN D PO Take  by mouth.       No current facility-administered medications for this visit.         Physical Exam:   Gen:           Alert and oriented, No apparent distress. Mood and affect appropriate, normal interaction with examiner.  Eyes:          PERRL, EOM intact, sclere white, conjunctive moist.  Ears:          Not examined.   Hearing:     Grossly intact.  Nose:          Normal, no lesions or deformities.  Dentition:    Good dentition.  Oropharynx:   Tongue normal, posterior pharynx without erythema or exudate.  Neck:        Supple, trachea midline, no masses.  Respiratory Effort: No  intercostal retractions or use of accessory muscles.   Lung Auscultation:      Clear to auscultation bilaterally; no rales, rhonchi or wheezing.  CV:            Regular rate and rhythm. No murmurs, rubs or gallops.  Abd:           Not examined.   Lymphadenopathy: Not examined.  Gait and Station: Normal.  Digits and Nails: No clubbing, cyanosis, petechiae, or nodes.   Cranial Nerves: II-XII grossly intact.  Skin:        No rashes, lesions or ulcers noted.               Ext:           No cyanosis or edema.      Assessment:  1. JODIE (obstructive sleep apnea)            Plan:  Patient is using and benefiting from CPAP therapy.  Compliance shows adequate use and control of JODIE.  PAP is greater than 5 years old.  Therefore, we will order new auto CPAP with settings 5 to 12 cm/H2O.  Patient advised to follow-up within 90 days of receiving device.  Patient also complains of waking up choking or gasping or feelings of suffocation.  Pressure is currently set to 8 cm/H2O.  It is possible that the machine is shutting off on its own.  We also assess OPO on CPAP to ensure that oxygen needs are met.    Please note that this dictation was created using voice recognition software. I have made every reasonable attempt to correct obvious errors, but it is possible there are errors of grammar and possibly content that I did not discover before finalizing the note.

## 2023-08-03 NOTE — PATIENT INSTRUCTIONS
"Placed for CPAP to the following DME company.  Please contact in 1 week to quire about order status/availability.  Please follow-up within 90 days on new device.  She will obtain overnight pulse oximetry test to be done on CPAP through following DME.    ELIO : Nathan    Once you receive your new PAP machine from the Durable Medical Equipment company you're referred to, we must see you back for an office visit between 30-90 days of you using the machine to review compliance.  If you were ordered an ASV machine, we need to see you in office no sooner than your 60th day on therapy.     This is a very important time frame for insurance purposes. If you do not follow up with our office for compliance your insurance may not continue to pay for the machine. Also if you do not use the machine for at least 4 hours each night, you may be deemed \"Incompliant\", in which case the insurance may also not continue to pay for the machine.    If you are incompliant, you may have to surrender your machine to the DME company and start the process over if you wish to continue therapy after that. Meaning a new office consult and new sleep studies.    For your first visit back with our office, please bring the whole machine with the power cord. We will download the compliance off the SD card in the machine, and are able to make changes if need be in office. Some machines have a modem and we can access the data wirelessly, if this is the case please make sure the DME company grants us access to your machine.  For all follow up appointments after that, you will only need to bring the SD card to the appointment.    If you are having any issues with the mask, you have a 30 day window to exchange and try something else with your DME company.  If you are having issues with the pressure, please call our office at [phone].  These issues can cause you to not be able to use machine appropriately, there for make you incompliant.    Please call " our office if you have any questions.    Thank you, [facility] [place].  [phone]

## 2024-08-26 ENCOUNTER — OFFICE VISIT (OUTPATIENT)
Dept: SLEEP MEDICINE | Facility: MEDICAL CENTER | Age: 44
End: 2024-08-26
Attending: PHYSICIAN ASSISTANT
Payer: OTHER GOVERNMENT

## 2024-08-26 VITALS
WEIGHT: 218 LBS | DIASTOLIC BLOOD PRESSURE: 78 MMHG | OXYGEN SATURATION: 97 % | HEART RATE: 70 BPM | SYSTOLIC BLOOD PRESSURE: 124 MMHG | HEIGHT: 63 IN | BODY MASS INDEX: 38.62 KG/M2 | RESPIRATION RATE: 16 BRPM

## 2024-08-26 DIAGNOSIS — G47.33 OSA (OBSTRUCTIVE SLEEP APNEA): ICD-10-CM

## 2024-08-26 PROCEDURE — 3078F DIAST BP <80 MM HG: CPT | Performed by: PHYSICIAN ASSISTANT

## 2024-08-26 PROCEDURE — 99213 OFFICE O/P EST LOW 20 MIN: CPT | Performed by: PHYSICIAN ASSISTANT

## 2024-08-26 PROCEDURE — 3074F SYST BP LT 130 MM HG: CPT | Performed by: PHYSICIAN ASSISTANT

## 2024-08-26 ASSESSMENT — ENCOUNTER SYMPTOMS
PALPITATIONS: 1
WHEEZING: 0
COUGH: 0
INSOMNIA: 1
SPUTUM PRODUCTION: 0
FEVER: 0
HEADACHES: 0
TREMORS: 0
SHORTNESS OF BREATH: 1
CHILLS: 0
SINUS PAIN: 0
WEIGHT LOSS: 0
ROS GI COMMENTS: NO DENTURES, NO MISSING TEETH, SOME SWALLOWING ISSUES
HEARTBURN: 1
SORE THROAT: 0
DIZZINESS: 0
ORTHOPNEA: 0

## 2024-08-26 NOTE — PROGRESS NOTES
"Chief Complaint   Patient presents with    Apnea     Last Office Visit 8/3/23 with KOREY Resendiz    Settings: auto CPAP with settings 5 to 12 cm/H2O    Set up: 1/5/24       HPI:  Karol Rhodes is a 44 y.o. year old female here today for follow-up on obstructive sleep apnea.  Last seen in clinic 8/3/2023 by MERCEDES Handlye.  Previously evaluated by Dr. Tovar on 7/6/2020.    Past Medical History: JODIE, obesity, irregular heartbeat, bronchitis, shortness of breath with exertion, dental disorder.    Vitals:  /78 (BP Location: Left arm, Patient Position: Sitting, BP Cuff Size: Adult)   Pulse 70   Resp 16   Ht 1.6 m (5' 3\")   Wt 98.9 kg (218 lb)   SpO2 97% BMI of 38.62 kg/m²    Recent Imaging: None    Currently using  Resmed auto CPAP @ 5-12 cm H20 pressure; compliance reviewed for 7/27/2024 through 8/25/2024, days used 30/30, average daily usage 7 hours 57 minutes, 100% of days greater than or equal to 4 hours, mask leak at 1 LPM at 95th percentile, AHI 0.9 per hour.  See media for full report.    Device obtained 1/5/2024  DME provider [facility]   Mask interface fullface mask    Polysomnogram obtained 5/21/2018 demonstrated overall AHI of 17.8 events per hour increasing to 68 during REM sleep and 45.2 while supine.  Low O2 sat of 86% with sats less than or equal to 88 for 2-1/2 minutes.  With treatment low O2 sat of 90%.  Patient was trialed on CPAP therapy with best response to CPAP of 7.    Sleep schedule goes to bed 1030-11 PM, wakens 7 AM , and gets up during the night 0-1 times   Symptoms dry mouth/excessive airway dryness, fatigue, is experiencing shortness of breath with gasping episodes at night worse from prior annual visit, some issues going to sleep due to anxiety related to these gasping episodes.        Review of Systems   Constitutional:  Positive for malaise/fatigue. Negative for chills, fever and weight loss.   HENT:  Negative for congestion, hearing loss, nosebleeds, sinus " pain, sore throat and tinnitus.    Respiratory:  Positive for shortness of breath (gasping episodes at night). Negative for cough, sputum production and wheezing.    Cardiovascular:  Positive for chest pain (with gasping episodes) and palpitations (with caffeine). Negative for orthopnea and leg swelling.   Gastrointestinal:  Positive for heartburn (occasional).        No dentures, no missing teeth, some swallowing issues    Neurological:  Negative for dizziness, tremors and headaches.   Psychiatric/Behavioral:  The patient has insomnia (some issues going to sleep due to anxiety).        Past Medical History:   Diagnosis Date    Arthritis     Bronchitis     Dental disorder     lower partial retainer    Fatigue     History of chickenpox     Infectious disease     covid 2020    Menarche     age 9    Pain     left knee    PONV (postoperative nausea and vomiting)     Skipped heart beats     Sleep apnea     Sleep difficulties     increased daytime sleepiness    SOB (shortness of breath)     Weight gain        Past Surgical History:   Procedure Laterality Date    PB TOTAL KNEE ARTHROPLASTY Left 4/26/2023    Procedure: LEFT TOTAL KNEE ARTHROPLASTY;  Surgeon: Benedicto Martinez M.D.;  Location: SURGERY [facility];  Service: Orthopedics    GYN SURGERY      vaginal cyst removal    OTHER ORTHOPEDIC SURGERY      left knee x 5, right knee       Family History   Problem Relation Age of Onset    Arthritis Mother     Cancer Other         cancer grandparents    Other Other         bleeding tendency grandmother; auth with convulsions    Diabetes Other         nephew and neice       Social History     Socioeconomic History    Marital status:      Spouse name: Not on file    Number of children: Not on file    Years of education: Not on file    Highest education level: Not on file   Occupational History    Not on file   Tobacco Use    Smoking status: Former     Current packs/day: 0.25     Average packs/day: 0.3 packs/day for  5.0 years (1.3 ttl pk-yrs)     Types: Cigarettes    Smokeless tobacco: Never    Tobacco comments:     smoked only socially, off and on over 5yrs; quit quite a few yrs ago   Vaping Use    Vaping status: Never Used   Substance and Sexual Activity    Alcohol use: Yes     Comment: beer and liquor rare use 1-2    Drug use: Not Currently     Comment: cbd, thc gummies in the past    Sexual activity: Yes     Partners: Male     Comment: ; moved back here 10/'14 (from [place])  grew up here; 2 step children but they don't live w/ pt and her    Other Topics Concern    Not on file   Social History Narrative    Not on file     Social Determinants of Health     Financial Resource Strain: Not on file   Food Insecurity: Not on file   Transportation Needs: Not on file   Physical Activity: Not on file   Stress: Not on file   Social Connections: Not on file   Intimate Partner Violence: Not on file   Housing Stability: Not on file       Allergies as of 08/26/2024 - Reviewed 08/26/2024   Allergen Reaction Noted    Trace metals Rash 04/19/2023    Iodine contrast [diagnostic x-ray materials] Rash 09/13/2022          Current medications as of today   Current Outpatient Medications   Medication Sig Dispense Refill    Cholecalciferol (VITAMIN D3) 1.25 MG (68456 UT) Cap Take 10,000 Int'l Units/day by mouth every day.      Cyanocobalamin (VITAMIN B-12) 2500 MCG SL Tab Place  under the tongue 3 times a week.      Omega-3 Fatty Acids (FISH OIL) 435 MG Cap Take  by mouth every day.      MAGNESIUM OXIDE PO Take  by mouth every day.      Probiotic Product (PROBIOTIC PO) Take  by mouth every day.      Menaquinone-7 (VITAMIN K2 PO) Take  by mouth every day.      VITAMIN D PO Take  by mouth.      benzonatate (TESSALON) 100 MG Cap Take 1 Capsule by mouth 3 times a day as needed for Cough. (Patient not taking: Reported on 8/3/2023) 60 Capsule 0     No current facility-administered medications for this visit.         Physical Exam:   Gen:            Alert and oriented, No apparent distress. Mood and affect appropriate, normal interaction with examiner.   Hearing:     Grossly intact.  Nose:          Normal, no lesions or deformities.  Dentition:    Fair dentition.   Oropharynx:   Tongue normal, posterior pharynx without erythema or exudate.  Mallampati Classification: II  Neck:        Supple, trachea midline, no masses.  Respiratory Effort: No intercostal retractions or use of accessory muscles.   Gait and Station: Normal.  Digits and Nails: No clubbing, cyanosis, petechiae, or nodes.   Skin:        No rashes, lesions or ulcers noted.               Ext:           No cyanosis or edema.      Immunizations:  Flu: Recommended  SARS CoV2 Vaccine: Recommended    Assessment / Plan:  1. JODIE (obstructive sleep apnea)  - Overnight Oximetry; Future  - DME Mask and Supplies    Patient with increased gasping episodes during night as well as some shortness of breath during the day.  Reports intentional weight loss of 21 pounds but not yet at target.  Obtain overnight oximetry to assess nocturnal oxygen saturations.  Reach out to patient via MyChart for those results.  We did review compliance which is excellent, patient is demonstrating use and benefit.  Send updated order for mask and supplies to [facility].  Patient was reminded to use distilled water only in humidifier chamber, reviewed equipment cleaning as well as equipment replacement schedule.  Patient to follow-up in 1 year, sooner if needed.    Follow-up:   Return in about 1 year (around 8/26/2025) for Return with Sruthi Lyman PA-C.    Please note that this dictation was created using voice recognition software. I have made every reasonable attempt to correct obvious errors, but it is possible there are errors of grammar and possibly content that I did not discover before finalizing the note.

## 2024-08-26 NOTE — PATIENT INSTRUCTIONS
1-patient with increased gasping episodes during night as well as some shortness of breath during day  2-has lost approx 21 lbs not yet at target  3-will obtain overnight oximetry to assess  4-reviewed compliance which is excellent  5-demonstrating use and benefit  6-send updated order for mask and supplies to [facility]  7-As a reminder use distilled water only in humidifier chamber.    8-Today we reviewed equipment cleaning  once weekly minimum  mask, tubing and water chamber  use dedicated container  use mild soap and water  SoClean or other ozone  are not recommended  white vinegar and water solution is no longer recommended  hang tubing to dry  mask sanitizing wipes are an option for use   9-Equipment replacement schedule : Mask every 6 months, Head gear every 6 months, Tubing every 3 months, Ultra-fine filters 2 times per month, Humidifier chamber every 6 months   10-follow up in one year, sooner if needed  11-reach out to patient regarding overnight oximetry results via my chart

## 2024-09-20 ENCOUNTER — HOME STUDY (OUTPATIENT)
Dept: SLEEP MEDICINE | Facility: MEDICAL CENTER | Age: 44
End: 2024-09-20
Attending: PHYSICIAN ASSISTANT
Payer: OTHER GOVERNMENT

## 2024-09-20 DIAGNOSIS — G47.33 OSA (OBSTRUCTIVE SLEEP APNEA): ICD-10-CM

## 2024-09-20 PROCEDURE — 94762 N-INVAS EAR/PLS OXIMTRY CONT: CPT | Performed by: INTERNAL MEDICINE

## 2024-09-20 PROCEDURE — 94762 N-INVAS EAR/PLS OXIMTRY CONT: CPT | Performed by: PHYSICIAN ASSISTANT

## 2024-09-29 NOTE — PROCEDURES
This is an overnight oximetry study performed on September 23, 2024 for duration of 7 hours and 41 minutes on auto positive airway pressure between 5 and 12 cm of water.    Basal SpO2 was 93%.  Total time spent below saturation of 88% is 2 minutes.  Treatment appears adequate.